# Patient Record
Sex: FEMALE | Race: WHITE | NOT HISPANIC OR LATINO | Employment: FULL TIME | URBAN - METROPOLITAN AREA
[De-identification: names, ages, dates, MRNs, and addresses within clinical notes are randomized per-mention and may not be internally consistent; named-entity substitution may affect disease eponyms.]

---

## 2017-11-01 ENCOUNTER — HOSPITAL ENCOUNTER (OUTPATIENT)
Dept: LAB | Facility: MEDICAL CENTER | Age: 52
End: 2017-11-01
Attending: GENERAL PRACTICE
Payer: COMMERCIAL

## 2017-11-01 PROCEDURE — 83090 ASSAY OF HOMOCYSTEINE: CPT

## 2017-11-01 PROCEDURE — 84443 ASSAY THYROID STIM HORMONE: CPT

## 2017-11-01 PROCEDURE — 84480 ASSAY TRIIODOTHYRONINE (T3): CPT

## 2017-11-01 PROCEDURE — 82670 ASSAY OF TOTAL ESTRADIOL: CPT

## 2017-11-01 PROCEDURE — 84436 ASSAY OF TOTAL THYROXINE: CPT

## 2017-11-01 PROCEDURE — 83550 IRON BINDING TEST: CPT

## 2017-11-01 PROCEDURE — 80053 COMPREHEN METABOLIC PANEL: CPT

## 2017-11-01 PROCEDURE — 82607 VITAMIN B-12: CPT

## 2017-11-01 PROCEDURE — 82306 VITAMIN D 25 HYDROXY: CPT

## 2017-11-01 PROCEDURE — 83525 ASSAY OF INSULIN: CPT

## 2017-11-01 PROCEDURE — 85025 COMPLETE CBC W/AUTO DIFF WBC: CPT

## 2017-11-01 PROCEDURE — 83540 ASSAY OF IRON: CPT

## 2017-11-01 PROCEDURE — 82626 DEHYDROEPIANDROSTERONE: CPT

## 2017-11-01 PROCEDURE — 82533 TOTAL CORTISOL: CPT

## 2017-11-01 PROCEDURE — 82746 ASSAY OF FOLIC ACID SERUM: CPT

## 2017-11-01 PROCEDURE — 86141 C-REACTIVE PROTEIN HS: CPT

## 2017-11-01 PROCEDURE — 81383 HLA II TYPING 1 ALLELE HR: CPT

## 2017-11-01 PROCEDURE — 80061 LIPID PANEL: CPT

## 2017-11-01 PROCEDURE — 81003 URINALYSIS AUTO W/O SCOPE: CPT

## 2017-11-01 PROCEDURE — 84402 ASSAY OF FREE TESTOSTERONE: CPT

## 2017-11-01 PROCEDURE — 84144 ASSAY OF PROGESTERONE: CPT

## 2017-11-01 PROCEDURE — 36415 COLL VENOUS BLD VENIPUNCTURE: CPT

## 2017-11-01 PROCEDURE — 84481 FREE ASSAY (FT-3): CPT

## 2017-11-01 PROCEDURE — 85652 RBC SED RATE AUTOMATED: CPT

## 2017-11-02 LAB
25(OH)D3 SERPL-MCNC: 15 NG/ML (ref 30–100)
ALBUMIN SERPL BCP-MCNC: 4.2 G/DL (ref 3.2–4.9)
ALBUMIN/GLOB SERPL: 1.6 G/DL
ALP SERPL-CCNC: 39 U/L (ref 30–99)
ALT SERPL-CCNC: 21 U/L (ref 2–50)
ANION GAP SERPL CALC-SCNC: 7 MMOL/L (ref 0–11.9)
APPEARANCE UR: CLEAR
AST SERPL-CCNC: 18 U/L (ref 12–45)
BASOPHILS # BLD AUTO: 0.7 % (ref 0–1.8)
BASOPHILS # BLD: 0.04 K/UL (ref 0–0.12)
BILIRUB SERPL-MCNC: 0.9 MG/DL (ref 0.1–1.5)
BILIRUB UR QL STRIP.AUTO: NEGATIVE
BUN SERPL-MCNC: 19 MG/DL (ref 8–22)
CALCIUM SERPL-MCNC: 9 MG/DL (ref 8.5–10.5)
CHLORIDE SERPL-SCNC: 105 MMOL/L (ref 96–112)
CHOLEST SERPL-MCNC: 203 MG/DL (ref 100–199)
CO2 SERPL-SCNC: 27 MMOL/L (ref 20–33)
COLOR UR: YELLOW
CORTIS SERPL-MCNC: 11.1 UG/DL (ref 0–23)
CREAT SERPL-MCNC: 0.64 MG/DL (ref 0.5–1.4)
CRP SERPL HS-MCNC: 0.9 MG/L (ref 0–7.5)
CULTURE IF INDICATED INDCX: NO UA CULTURE
EOSINOPHIL # BLD AUTO: 0.09 K/UL (ref 0–0.51)
EOSINOPHIL NFR BLD: 1.7 % (ref 0–6.9)
ERYTHROCYTE [DISTWIDTH] IN BLOOD BY AUTOMATED COUNT: 40.8 FL (ref 35.9–50)
ERYTHROCYTE [SEDIMENTATION RATE] IN BLOOD BY WESTERGREN METHOD: 6 MM/HOUR (ref 0–30)
ESTRADIOL SERPL-MCNC: 90 PG/ML
FOLATE SERPL-MCNC: 11.3 NG/ML
GFR SERPL CREATININE-BSD FRML MDRD: >60 ML/MIN/1.73 M 2
GLOBULIN SER CALC-MCNC: 2.6 G/DL (ref 1.9–3.5)
GLUCOSE SERPL-MCNC: 79 MG/DL (ref 65–99)
GLUCOSE UR STRIP.AUTO-MCNC: NEGATIVE MG/DL
HCT VFR BLD AUTO: 44.6 % (ref 37–47)
HCYS SERPL-SCNC: 11.2 UMOL/L
HDLC SERPL-MCNC: 56 MG/DL
HGB BLD-MCNC: 15.1 G/DL (ref 12–16)
IMM GRANULOCYTES # BLD AUTO: 0.01 K/UL (ref 0–0.11)
IMM GRANULOCYTES NFR BLD AUTO: 0.2 % (ref 0–0.9)
IRON SATN MFR SERPL: 28 % (ref 15–55)
IRON SERPL-MCNC: 77 UG/DL (ref 40–170)
KETONES UR STRIP.AUTO-MCNC: ABNORMAL MG/DL
LDLC SERPL CALC-MCNC: 119 MG/DL
LEUKOCYTE ESTERASE UR QL STRIP.AUTO: NEGATIVE
LYMPHOCYTES # BLD AUTO: 1.25 K/UL (ref 1–4.8)
LYMPHOCYTES NFR BLD: 23.4 % (ref 22–41)
MCH RBC QN AUTO: 30.8 PG (ref 27–33)
MCHC RBC AUTO-ENTMCNC: 33.9 G/DL (ref 33.6–35)
MCV RBC AUTO: 91 FL (ref 81.4–97.8)
MICRO URNS: ABNORMAL
MONOCYTES # BLD AUTO: 0.34 K/UL (ref 0–0.85)
MONOCYTES NFR BLD AUTO: 6.4 % (ref 0–13.4)
NEUTROPHILS # BLD AUTO: 3.62 K/UL (ref 2–7.15)
NEUTROPHILS NFR BLD: 67.6 % (ref 44–72)
NITRITE UR QL STRIP.AUTO: NEGATIVE
NRBC # BLD AUTO: 0 K/UL
NRBC BLD AUTO-RTO: 0 /100 WBC
PH UR STRIP.AUTO: 6 [PH]
PLATELET # BLD AUTO: 240 K/UL (ref 164–446)
PMV BLD AUTO: 9.9 FL (ref 9–12.9)
POTASSIUM SERPL-SCNC: 4.1 MMOL/L (ref 3.6–5.5)
PROGEST SERPL-MCNC: 0.31 NG/ML
PROT SERPL-MCNC: 6.8 G/DL (ref 6–8.2)
PROT UR QL STRIP: NEGATIVE MG/DL
RBC # BLD AUTO: 4.9 M/UL (ref 4.2–5.4)
RBC UR QL AUTO: NEGATIVE
SODIUM SERPL-SCNC: 139 MMOL/L (ref 135–145)
SP GR UR STRIP.AUTO: 1.03
T3 SERPL-MCNC: 95.8 NG/DL (ref 60–181)
T3FREE SERPL-MCNC: 3.35 PG/ML (ref 2.4–4.2)
T4 SERPL-MCNC: 6.8 UG/DL (ref 4–12)
TIBC SERPL-MCNC: 277 UG/DL (ref 250–450)
TRIGL SERPL-MCNC: 141 MG/DL (ref 0–149)
TSH SERPL DL<=0.005 MIU/L-ACNC: 2.01 UIU/ML (ref 0.3–3.7)
UROBILINOGEN UR STRIP.AUTO-MCNC: 0.2 MG/DL
VIT B12 SERPL-MCNC: 256 PG/ML (ref 211–911)
WBC # BLD AUTO: 5.4 K/UL (ref 4.8–10.8)

## 2017-11-03 LAB — INSULIN P FAST SERPL-ACNC: 6 UIU/ML (ref 3–19)

## 2017-11-04 LAB
DHEA SERPL-MCNC: 3.91 NG/ML (ref 0.63–4.7)
TESTOST FREE SERPL-MCNC: 2.7 PG/ML (ref 0.6–3.8)

## 2017-11-06 LAB
HLA-DQB1 QL: POSITIVE
NARCOLEPSY SPEC Q0167: ABNORMAL

## 2018-02-15 ENCOUNTER — HOSPITAL ENCOUNTER (OUTPATIENT)
Dept: RADIOLOGY | Facility: MEDICAL CENTER | Age: 53
End: 2018-02-15
Attending: OBSTETRICS & GYNECOLOGY
Payer: COMMERCIAL

## 2018-02-15 DIAGNOSIS — Z12.31 ENCOUNTER FOR SCREENING MAMMOGRAM FOR MALIGNANT NEOPLASM OF BREAST: ICD-10-CM

## 2018-02-15 PROCEDURE — 77063 BREAST TOMOSYNTHESIS BI: CPT

## 2018-11-02 ENCOUNTER — OFFICE VISIT (OUTPATIENT)
Dept: URGENT CARE | Facility: CLINIC | Age: 53
End: 2018-11-02
Payer: COMMERCIAL

## 2018-11-02 VITALS
TEMPERATURE: 98.4 F | HEART RATE: 94 BPM | OXYGEN SATURATION: 96 % | SYSTOLIC BLOOD PRESSURE: 120 MMHG | HEIGHT: 65 IN | BODY MASS INDEX: 24.32 KG/M2 | DIASTOLIC BLOOD PRESSURE: 84 MMHG | WEIGHT: 146 LBS

## 2018-11-02 DIAGNOSIS — R05.9 COUGH: ICD-10-CM

## 2018-11-02 DIAGNOSIS — J06.9 UPPER RESPIRATORY TRACT INFECTION, UNSPECIFIED TYPE: ICD-10-CM

## 2018-11-02 DIAGNOSIS — R09.81 SINUS CONGESTION: ICD-10-CM

## 2018-11-02 DIAGNOSIS — G43.809 OTHER MIGRAINE WITHOUT STATUS MIGRAINOSUS, NOT INTRACTABLE: ICD-10-CM

## 2018-11-02 PROCEDURE — 99204 OFFICE O/P NEW MOD 45 MIN: CPT | Performed by: PHYSICIAN ASSISTANT

## 2018-11-02 RX ORDER — KETOROLAC TROMETHAMINE 30 MG/ML
60 INJECTION, SOLUTION INTRAMUSCULAR; INTRAVENOUS ONCE
Status: COMPLETED | OUTPATIENT
Start: 2018-11-02 | End: 2018-11-02

## 2018-11-02 RX ORDER — AMOXICILLIN AND CLAVULANATE POTASSIUM 875; 125 MG/1; MG/1
1 TABLET, FILM COATED ORAL 2 TIMES DAILY
Qty: 20 TAB | Refills: 0 | Status: SHIPPED | OUTPATIENT
Start: 2018-11-02 | End: 2018-11-05

## 2018-11-02 RX ORDER — ONDANSETRON 4 MG/1
4 TABLET, FILM COATED ORAL EVERY 6 HOURS PRN
Qty: 20 TAB | Refills: 1 | Status: SHIPPED | OUTPATIENT
Start: 2018-11-02 | End: 2020-08-05

## 2018-11-02 RX ADMIN — KETOROLAC TROMETHAMINE 60 MG: 30 INJECTION, SOLUTION INTRAMUSCULAR; INTRAVENOUS at 10:46

## 2018-11-02 ASSESSMENT — ENCOUNTER SYMPTOMS
COUGH: 1
SORE THROAT: 0
EYE REDNESS: 0
DIARRHEA: 0
BLURRED VISION: 0
SHORTNESS OF BREATH: 0
HEADACHES: 1
SENSORY CHANGE: 0
PHOTOPHOBIA: 0
ABDOMINAL PAIN: 0
MYALGIAS: 0
VOMITING: 0
NAUSEA: 1
FOCAL WEAKNESS: 0
NUMBER OF EPISODES OF EMESIS TODAY: 1
SPUTUM PRODUCTION: 1
TINGLING: 0
CHILLS: 0
WHEEZING: 0
FEVER: 0

## 2018-11-02 NOTE — LETTER
November 2, 2018       Patient: Antoinette Cano   YOB: 1965   Date of Visit: 11/2/2018         To Whom It May Concern:    It is my medical opinion that Antoinette Cano should be excused from work for today due to illness.        If you have any questions or concerns, please don't hesitate to call [phone]          Sincerely,          Les Estrada P.A.-C.  Electronically Signed

## 2018-11-02 NOTE — PROGRESS NOTES
Subjective:     Antoinette Cano is a 53 y.o. female who presents for Nausea and Emesis       Nausea   This is a new problem. The current episode started 1 to 4 weeks ago. Associated symptoms include congestion, coughing, headaches and nausea ( with HA). Pertinent negatives include no abdominal pain, chills, fever, myalgias, rash, sore throat or vomiting.   Emesis   Associated symptoms include congestion, coughing, headaches and nausea ( with HA). Pertinent negatives include no abdominal pain, chills, fever, myalgias, rash, sore throat or vomiting.     Notes last 6-7d of cough, some prod now dry cough, denies fever/chills, c/o some emesis yesteray, notes some nausea w/ OTC cold meds, denies abdpain/diarrhea/rash, did have migraine yesterday, still w some mild s/sx today, PMH of migraine, c/o frontal sinus press w/ HA, denies PMH of sinusitis, denies wheeze, denies PMH of asthma, remote PMH of bronchitis, denies PMH of pneumonia, denies seasona allerg. Tried using cough meds, tylenol. Denies abdpain or diarrhea. C/o some abd cramping.    Past Medical History:   Diagnosis Date   • Dental disorder 4/13    multiple root canals   • Indigestion    • Infected puncture wound of plantar aspect of foot 6/23/13    on antibiotics   • Other specified symptom associated with female genital organs      Past Surgical History:   Procedure Laterality Date   • VAGINAL HYSTERECTOMY SCOPE TOTAL  7/23/2013    Performed by Cory Medeiros M.D. at SURGERY SAME DAY [facility]   • CYSTOSCOPY  7/23/2013    Performed by Cory Medeiros M.D. at SURGERY SAME DAY [facility]   • HYSTEROSCOPY WITH VIDEO OPERATIVE  2/7/2012    Performed by CORY MEDEIROS at SURGERY SAME DAY [facility]   • DILATION AND CURETTAGE  2/7/2012    Performed by CORY MEDEIROS at SURGERY SAME DAY [facility]   • OTHER  2010    tooth implant   • OTHER  2009    tooth extraction   • OTHER  2008    tooth extraction   • OTHER      LT knee surg     Social  "History     Social History   • Marital status:      Spouse name: N/A   • Number of children: N/A   • Years of education: N/A     Occupational History   • Not on file.     Social History Main Topics   • Smoking status: Never Smoker   • Smokeless tobacco: Never Used   • Alcohol use Yes      Comment: 1-2 per week   • Drug use: No   • Sexual activity: Not on file     Other Topics Concern   • Not on file     Social History Narrative   • No narrative on file    No family history on file. Review of Systems   Constitutional: Negative for chills and fever.   HENT: Positive for congestion. Negative for ear pain and sore throat.    Eyes: Negative for blurred vision, photophobia and redness.   Respiratory: Positive for cough and sputum production. Negative for shortness of breath and wheezing.    Gastrointestinal: Positive for nausea ( with HA). Negative for abdominal pain, diarrhea and vomiting.   Genitourinary: Negative for dysuria, frequency and hematuria.   Musculoskeletal: Negative for myalgias.   Skin: Negative for rash.   Neurological: Positive for headaches. Negative for tingling, sensory change and focal weakness.   Endo/Heme/Allergies: Negative for environmental allergies.     Allergies   Allergen Reactions   • Cleocin [Clindamycin Hcl] Vomiting   I have worn a mask for the entire encounter with this patient.    Objective:   /84 (BP Location: Left arm, Patient Position: Sitting)   Pulse 94   Temp 36.9 °C (98.4 °F) (Temporal)   Ht 1.651 m (5' 5\")   Wt 66.2 kg (146 lb)   LMP 06/29/2013   SpO2 96%   Breastfeeding? No   BMI 24.30 kg/m²   Physical Exam   Constitutional: She is oriented to person, place, and time. She appears well-developed and well-nourished. No distress.   HENT:   Head: Normocephalic and atraumatic.   Right Ear: Tympanic membrane, external ear and ear canal normal.   Left Ear: Tympanic membrane, external ear and ear canal normal.   Nose: Right sinus exhibits no maxillary sinus " tenderness and no frontal sinus tenderness. Left sinus exhibits maxillary sinus tenderness. Left sinus exhibits no frontal sinus tenderness.   Mouth/Throat: Uvula is midline and mucous membranes are normal. Posterior oropharyngeal erythema ( mild PND) present. No oropharyngeal exudate, posterior oropharyngeal edema or tonsillar abscesses.   Eyes: Pupils are equal, round, and reactive to light. Conjunctivae, EOM and lids are normal. Right eye exhibits no discharge and no exudate. Left eye exhibits no discharge and no exudate. Right conjunctiva is not injected. Right conjunctiva has no hemorrhage. Left conjunctiva is not injected. Left conjunctiva has no hemorrhage. No scleral icterus.   Neck: Neck supple.   Pulmonary/Chest: Effort normal. No respiratory distress. She has no decreased breath sounds. She has no wheezes. She has no rhonchi. She has no rales.   Musculoskeletal: Normal range of motion.   Lymphadenopathy:     She has cervical adenopathy ( mild bilat).   Neurological: She is alert and oriented to person, place, and time. She has normal strength. She is not disoriented. No cranial nerve deficit or sensory deficit. Coordination and gait normal.   Skin: Skin is warm and dry. She is not diaphoretic. No erythema. No pallor.   Psychiatric: Her speech is normal and behavior is normal.   Nursing note and vitals reviewed.  toradol - tolerates well      Assessment/Plan:   Assessment    1. Upper respiratory tract infection, unspecified type    2. Cough  - amoxicillin-clavulanate (AUGMENTIN) 875-125 MG Tab; Take 1 Tab by mouth 2 times a day.  Dispense: 20 Tab; Refill: 0    3. Sinus congestion  - amoxicillin-clavulanate (AUGMENTIN) 875-125 MG Tab; Take 1 Tab by mouth 2 times a day.  Dispense: 20 Tab; Refill: 0    4. Other migraine without status migrainosus, not intractable  - ketorolac (TORADOL) injection 60 mg; 2 mL by Intramuscular route Once.  - ondansetron (ZOFRAN) 4 MG Tab tablet; Take 1 Tab by mouth every 6 hours  as needed for Nausea/Vomiting.  Dispense: 20 Tab; Refill: 1  Supportive care is reviewed with patient/caregiver - recommend to push PO fluids and electrolytes, Nsaids/tylenol, netti pot/saline irrig, humidifier in home, flonase, ponaris, antihistamine, zofran PRN continued nausea, toradol for migraine now, Contingent antibiotic prescription given to patient to fill upon meeting criteria of guidelines discussed.   If filling,  take full course of Rx, take with probiotics, observe for resolution  Return to clinic with lack of resolution or progression of symptoms.      Differential diagnosis, natural history, supportive care, and indications for immediate follow-up discussed.

## 2018-11-05 ENCOUNTER — OFFICE VISIT (OUTPATIENT)
Dept: URGENT CARE | Facility: CLINIC | Age: 53
End: 2018-11-05
Payer: COMMERCIAL

## 2018-11-05 VITALS
SYSTOLIC BLOOD PRESSURE: 110 MMHG | TEMPERATURE: 98.3 F | DIASTOLIC BLOOD PRESSURE: 68 MMHG | OXYGEN SATURATION: 95 % | HEART RATE: 82 BPM | RESPIRATION RATE: 20 BRPM

## 2018-11-05 DIAGNOSIS — H66.002 ACUTE SUPPURATIVE OTITIS MEDIA OF LEFT EAR WITHOUT SPONTANEOUS RUPTURE OF TYMPANIC MEMBRANE, RECURRENCE NOT SPECIFIED: Primary | ICD-10-CM

## 2018-11-05 DIAGNOSIS — J40 BRONCHITIS: ICD-10-CM

## 2018-11-05 DIAGNOSIS — J06.9 URI WITH COUGH AND CONGESTION: ICD-10-CM

## 2018-11-05 PROCEDURE — 99214 OFFICE O/P EST MOD 30 MIN: CPT | Performed by: PHYSICIAN ASSISTANT

## 2018-11-05 RX ORDER — METHYLPREDNISOLONE 4 MG/1
TABLET ORAL
Qty: 21 TAB | Refills: 0 | Status: SHIPPED | OUTPATIENT
Start: 2018-11-05 | End: 2018-11-05 | Stop reason: SDUPTHER

## 2018-11-05 RX ORDER — CEFDINIR 300 MG/1
300 CAPSULE ORAL 2 TIMES DAILY
Qty: 20 CAP | Refills: 0 | Status: SHIPPED | OUTPATIENT
Start: 2018-11-05 | End: 2018-11-15

## 2018-11-05 RX ORDER — PROMETHAZINE HYDROCHLORIDE AND CODEINE PHOSPHATE 6.25; 1 MG/5ML; MG/5ML
5 SYRUP ORAL 4 TIMES DAILY PRN
Qty: 120 ML | Refills: 0 | Status: SHIPPED | OUTPATIENT
Start: 2018-11-05 | End: 2018-11-12

## 2018-11-05 RX ORDER — METHYLPREDNISOLONE 4 MG/1
TABLET ORAL
Qty: 21 TAB | Refills: 0 | Status: SHIPPED | OUTPATIENT
Start: 2018-11-05 | End: 2020-08-05

## 2018-11-05 RX ORDER — CEFDINIR 300 MG/1
300 CAPSULE ORAL 2 TIMES DAILY
Qty: 20 CAP | Refills: 0 | Status: SHIPPED | OUTPATIENT
Start: 2018-11-05 | End: 2018-11-05 | Stop reason: SDUPTHER

## 2018-11-06 NOTE — PROGRESS NOTES
Subjective:      Pt is a 53 y.o. female who presents with Otalgia (Almost a week earaches .)            HPI  PT presents to  clinic today complaining of sore throat, fevers, pressure in ears, cough, fatigue, runny nose, wheezing and SOB. PT denies CP, NVD, abdominal pain, joint pain. PT states these symptoms began around 5 days ago. PT states the pain is a 7/10 with coughing fits, aching in nature and worse at night. Pt has taken Augmentin for this condition and notes worsening symptoms and new ear pain on left. The pt's medication list, problem list, and allergies have been evaluated and reviewed during today's visit.    PMH:  Past Medical History:   Diagnosis Date   • Dental disorder 4/13    multiple root canals   • Indigestion    • Infected puncture wound of plantar aspect of foot 6/23/13    on antibiotics   • Other specified symptom associated with female genital organs        PSH:  Past Surgical History:   Procedure Laterality Date   • VAGINAL HYSTERECTOMY SCOPE TOTAL  7/23/2013    Performed by Cory Medeiros M.D. at SURGERY [facility] DAY [facility]   • CYSTOSCOPY  7/23/2013    Performed by Cory Medeiros M.D. at SURGERY SAME DAY [facility]   • HYSTEROSCOPY WITH VIDEO OPERATIVE  2/7/2012    Performed by CORY MEDEIROS at SURGERY SAME DAY [facility]   • DILATION AND CURETTAGE  2/7/2012    Performed by CORY MEDEIROS at SURGERY SAME DAY [facility]   • OTHER  2010    tooth implant   • OTHER  2009    tooth extraction   • OTHER  2008    tooth extraction   • OTHER      LT knee surg       Fam Hx:  the patient's family history is not pertinent to their current complaint      Soc HX:  Social History     Social History   • Marital status:      Spouse name: N/A   • Number of children: N/A   • Years of education: N/A     Occupational History   • Not on file.     Social History Main Topics   • Smoking status: Never Smoker   • Smokeless tobacco: Never Used   • Alcohol use Yes      Comment: 1-2 per  week   • Drug use: No   • Sexual activity: Not on file     Other Topics Concern   • Not on file     Social History Narrative   • No narrative on file         Medications:    Current Outpatient Prescriptions:   •  promethazine-codeine (PHENERGAN-CODEINE) 6.25-10 MG/5ML Syrup, Take 5 mL by mouth 4 times a day as needed for up to 7 days., Disp: 120 mL, Rfl: 0  •  cefdinir (OMNICEF) 300 MG Cap, Take 1 Cap by mouth 2 times a day for 10 days., Disp: 20 Cap, Rfl: 0  •  MethylPREDNISolone (MEDROL DOSEPAK) 4 MG Tablet Therapy Pack, Use as directed, Disp: 21 Tab, Rfl: 0  •  ondansetron (ZOFRAN) 4 MG Tab tablet, Take 1 Tab by mouth every 6 hours as needed for Nausea/Vomiting., Disp: 20 Tab, Rfl: 1      Allergies:  Cleocin [clindamycin hcl]    ROS  Review of Systems   Constitutional: Positive for malaise/fatigue. Negative for fever and diaphoresis.   HENT: Positive for congestion and sore throat. +ear pain on left.    Eyes: Negative for blurred vision, double vision and photophobia.   Respiratory: Positive for cough, sputum production, shortness of breath and wheezing. Negative for hemoptysis.    Cardiovascular: Negative for chest pain and palpitations.   Gastrointestinal: Negative for nausea, vomiting, abdominal pain, diarrhea and constipation.   Genitourinary: Negative for dysuria and flank pain.   Musculoskeletal: Negative for joint pain and myalgias.   Skin: Negative for itching and rash.   Neurological:  Negative for dizziness, tingling and weakness.   Endo/Heme/Allergies: Does not bruise/bleed easily.   Psychiatric/Behavioral: Negative for depression. The patient is not nervous/anxious.             Objective:     /68 (BP Location: Left arm, Patient Position: Sitting, BP Cuff Size: Adult)   Pulse 82   Temp 36.8 °C (98.3 °F) (Temporal)   Resp 20   LMP 06/29/2013   SpO2 95%      Physical Exam      Physical Exam   Constitutional: PT is oriented to person, place, and time. PT appears well-developed and  well-nourished. No distress.   HENT:   Head: Normocephalic and atraumatic.   Right Ear: Hearing, tympanic membrane, external ear and ear canal normal.   Left Ear: Hearing, external ear and ear canal normal. Tympanic membrane is erythematous and bulging. A middle ear effusion is present.   Nose: Mucosal edema, rhinorrhea and sinus tenderness present. Right sinus exhibits frontal sinus tenderness. Left sinus exhibits frontal sinus tenderness.   Mouth/Throat: Uvula is midline. Mucous membranes are pale. Posterior oropharyngeal edema and posterior oropharyngeal erythema present. No oropharyngeal exudate.   Eyes: Conjunctivae normal and EOM are normal. Pupils are equal, round, and reactive to light. Right eye exhibits no discharge. Left eye exhibits no discharge.   Neck: Normal range of motion. Neck supple. No thyromegaly present.   Cardiovascular: Normal rate, regular rhythm, normal heart sounds and intact distal pulses.  Exam reveals no gallop and no friction rub.    No murmur heard.  Pulmonary/Chest: Effort normal. No respiratory distress. PT has wheezes. PT has no rales. PT exhibits tenderness.   Abdominal: Soft. Bowel sounds are normal. PT exhibits no distension and no mass. There is no tenderness. There is no rebound and no guarding.   Musculoskeletal: Normal range of motion. PT exhibits no edema and no tenderness.   Lymphadenopathy:     PT has no cervical adenopathy.   Neurological: Pt is alert and oriented to person, place, and time. Pt has normal reflexes. No cranial nerve deficit.   Skin: Skin is warm and dry. No rash noted. No erythema.   Psychiatric: PT has a normal mood and affect. Pt behavior is normal. Judgment and thought content normal.          Assessment/Plan:     1. Acute suppurative otitis media of left ear without spontaneous rupture of tympanic membrane, recurrence not specified    - cefdinir (OMNICEF) 300 MG Cap; Take 1 Cap by mouth 2 times a day for 10 days.  Dispense: 20 Cap; Refill: 0  -  MethylPREDNISolone (MEDROL DOSEPAK) 4 MG Tablet Therapy Pack; Use as directed  Dispense: 21 Tab; Refill: 0    2. Bronchitis    - cefdinir (OMNICEF) 300 MG Cap; Take 1 Cap by mouth 2 times a day for 10 days.  Dispense: 20 Cap; Refill: 0  - MethylPREDNISolone (MEDROL DOSEPAK) 4 MG Tablet Therapy Pack; Use as directed  Dispense: 21 Tab; Refill: 0    3. URI with cough and congestion    - promethazine-codeine (PHENERGAN-CODEINE) 6.25-10 MG/5ML Syrup; Take 5 mL by mouth 4 times a day as needed for up to 7 days.  Dispense: 120 mL; Refill: 0  - MethylPREDNISolone (MEDROL DOSEPAK) 4 MG Tablet Therapy Pack; Use as directed  Dispense: 21 Tab; Refill: 0    Rest, fluids encouraged.  OTC decongestant for congestion/cough  AVS with medical info given.  Pt was in full understanding and agreement with the plan.  Follow-up as needed if symptoms worsen or fail to improve.

## 2019-02-28 ENCOUNTER — HOSPITAL ENCOUNTER (OUTPATIENT)
Dept: RADIOLOGY | Facility: MEDICAL CENTER | Age: 54
End: 2019-02-28
Attending: OBSTETRICS & GYNECOLOGY
Payer: COMMERCIAL

## 2019-02-28 DIAGNOSIS — Z12.31 BREAST CANCER SCREENING BY MAMMOGRAM: ICD-10-CM

## 2019-02-28 PROCEDURE — 77063 BREAST TOMOSYNTHESIS BI: CPT

## 2020-01-07 ENCOUNTER — OFFICE VISIT (OUTPATIENT)
Dept: URGENT CARE | Facility: CLINIC | Age: 55
End: 2020-01-07
Payer: COMMERCIAL

## 2020-01-07 VITALS
TEMPERATURE: 98.6 F | WEIGHT: 149 LBS | BODY MASS INDEX: 24.83 KG/M2 | DIASTOLIC BLOOD PRESSURE: 88 MMHG | SYSTOLIC BLOOD PRESSURE: 132 MMHG | OXYGEN SATURATION: 95 % | HEIGHT: 65 IN | HEART RATE: 82 BPM | RESPIRATION RATE: 14 BRPM

## 2020-01-07 DIAGNOSIS — J06.9 UPPER RESPIRATORY TRACT INFECTION, UNSPECIFIED TYPE: ICD-10-CM

## 2020-01-07 PROCEDURE — 99214 OFFICE O/P EST MOD 30 MIN: CPT | Performed by: PHYSICIAN ASSISTANT

## 2020-01-07 RX ORDER — BENZONATATE 200 MG/1
200 CAPSULE ORAL 3 TIMES DAILY PRN
Qty: 30 CAP | Refills: 0 | Status: SHIPPED
Start: 2020-01-07 | End: 2020-08-05

## 2020-01-07 ASSESSMENT — ENCOUNTER SYMPTOMS
RHINORRHEA: 1
SINUS PAIN: 0
VOMITING: 0
WHEEZING: 0
FEVER: 0
SHORTNESS OF BREATH: 0
SORE THROAT: 0
MYALGIAS: 0
HEADACHES: 1
CHILLS: 0
HEMOPTYSIS: 0
DIARRHEA: 0
ABDOMINAL PAIN: 0
SPUTUM PRODUCTION: 1
COUGH: 1
NAUSEA: 0
PALPITATIONS: 0

## 2020-01-07 NOTE — PROGRESS NOTES
Subjective:      Antoinette Cano is a 54 y.o. female who presents with Cough (x4 days) and Migraine            Cough   This is a new problem. Episode onset: 4 days  The problem has been unchanged. The cough is productive of sputum (small amount of yellow mucous today). Associated symptoms include headaches, nasal congestion and rhinorrhea. Pertinent negatives include no chest pain, chills, ear congestion, ear pain, fever, hemoptysis, myalgias, rash, sore throat, shortness of breath or wheezing. The symptoms are aggravated by lying down. Treatments tried: tylenol. Dayqil, Nyquil. There is no history of asthma, bronchitis or pneumonia.         Past Medical History:   Diagnosis Date   • Dental disorder 4/13    multiple root canals   • Indigestion    • Infected puncture wound of plantar aspect of foot 6/23/13    on antibiotics   • Other specified symptom associated with female genital organs        Past Surgical History:   Procedure Laterality Date   • VAGINAL HYSTERECTOMY SCOPE TOTAL  7/23/2013    Performed by Cory Medeiros M.D. at SURGERY SAME DAY [facility]   • CYSTOSCOPY  7/23/2013    Performed by Cory Medeiros M.D. at [facility] DAY [facility]   • HYSTEROSCOPY WITH VIDEO OPERATIVE  2/7/2012    Performed by CORY MEDEIROS at [facility]   • DILATION AND CURETTAGE  2/7/2012    Performed by CORY MEDEIROS at SURGERY SAME DAY [facility]   • OTHER  2010    tooth implant   • OTHER  2009    tooth extraction   • OTHER  2008    tooth extraction   • OTHER      LT knee surg       History reviewed. No pertinent family history.    Allergies   Allergen Reactions   • Cleocin [Clindamycin Hcl] Vomiting       Medications, Allergies, and current problem list reviewed today in Epic    Review of Systems   Constitutional: Positive for malaise/fatigue. Negative for chills and fever.   HENT: Positive for congestion and rhinorrhea. Negative for ear discharge, ear pain, sinus pain and sore  "throat.    Respiratory: Positive for cough and sputum production (scant yellow mucous). Negative for hemoptysis, shortness of breath and wheezing.    Cardiovascular: Negative for chest pain, palpitations and leg swelling.   Gastrointestinal: Negative for abdominal pain, diarrhea, nausea and vomiting.   Musculoskeletal: Negative for myalgias.   Skin: Negative for rash.   Neurological: Positive for headaches.     All other systems reviewed and are negative.        Objective:     /88 (BP Location: Right arm, Patient Position: Sitting)   Pulse 82   Temp 37 °C (98.6 °F)   Resp 14   Ht 1.651 m (5' 5\")   Wt 67.6 kg (149 lb)   LMP 06/29/2013   SpO2 95%   BMI 24.79 kg/m²      Physical Exam  Constitutional:       General: She is not in acute distress.  HENT:      Head: Normocephalic and atraumatic.      Right Ear: Tympanic membrane, ear canal and external ear normal.      Left Ear: Tympanic membrane, ear canal and external ear normal.      Nose: Congestion and rhinorrhea (clear) present.      Mouth/Throat:      Mouth: Mucous membranes are moist.   Eyes:      Conjunctiva/sclera: Conjunctivae normal.   Cardiovascular:      Rate and Rhythm: Normal rate and regular rhythm.      Heart sounds: Normal heart sounds. No murmur. No friction rub. No gallop.    Pulmonary:      Effort: Pulmonary effort is normal. No respiratory distress.      Breath sounds: Normal breath sounds. No wheezing, rhonchi or rales.   Musculoskeletal: Normal range of motion.   Lymphadenopathy:      Cervical: No cervical adenopathy.   Skin:     General: Skin is warm and dry.   Neurological:      General: No focal deficit present.      Mental Status: She is alert and oriented to person, place, and time.   Psychiatric:         Mood and Affect: Mood normal.         Behavior: Behavior normal.         Thought Content: Thought content normal.         Judgment: Judgment normal.                 Assessment/Plan:     1. Upper respiratory tract infection, " unspecified type  benzonatate (TESSALON) 200 MG capsule     Viral etiology discussed. No signs of bacterial illness on exam  Continue Conservative treatment.  Push fluids, rest, humidification.  RX tessalon       Differential diagnoses, Supportive care, and indications for immediate follow-up discussed with patient.   Instructed to return to clinic or nearest emergency department for any change in condition, further concerns, or worsening of symptoms.    The patient demonstrated a good understanding and agreed with the treatment plan.    Ilana Daniel P.A.-C.

## 2020-03-19 ENCOUNTER — APPOINTMENT (OUTPATIENT)
Dept: RADIOLOGY | Facility: MEDICAL CENTER | Age: 55
End: 2020-03-19
Attending: OBSTETRICS & GYNECOLOGY
Payer: COMMERCIAL

## 2020-06-03 ENCOUNTER — APPOINTMENT (OUTPATIENT)
Dept: RADIOLOGY | Facility: MEDICAL CENTER | Age: 55
End: 2020-06-03
Attending: OBSTETRICS & GYNECOLOGY
Payer: COMMERCIAL

## 2020-06-12 ENCOUNTER — TELEPHONE (OUTPATIENT)
Dept: SCHEDULING | Facility: IMAGING CENTER | Age: 55
End: 2020-06-12

## 2020-07-27 ENCOUNTER — APPOINTMENT (OUTPATIENT)
Dept: RADIOLOGY | Facility: MEDICAL CENTER | Age: 55
End: 2020-07-27
Attending: OBSTETRICS & GYNECOLOGY
Payer: COMMERCIAL

## 2020-08-05 ENCOUNTER — OFFICE VISIT (OUTPATIENT)
Dept: MEDICAL GROUP | Facility: PHYSICIAN GROUP | Age: 55
End: 2020-08-05
Payer: COMMERCIAL

## 2020-08-05 VITALS
WEIGHT: 145.8 LBS | HEART RATE: 70 BPM | RESPIRATION RATE: 20 BRPM | TEMPERATURE: 99 F | OXYGEN SATURATION: 98 % | HEIGHT: 65 IN | BODY MASS INDEX: 24.29 KG/M2 | SYSTOLIC BLOOD PRESSURE: 118 MMHG | DIASTOLIC BLOOD PRESSURE: 68 MMHG

## 2020-08-05 DIAGNOSIS — Z23 NEED FOR VACCINATION: ICD-10-CM

## 2020-08-05 DIAGNOSIS — R42 EPISODE OF DIZZINESS: ICD-10-CM

## 2020-08-05 DIAGNOSIS — Z00.00 ANNUAL PHYSICAL EXAM: ICD-10-CM

## 2020-08-05 PROCEDURE — 99214 OFFICE O/P EST MOD 30 MIN: CPT | Mod: 25 | Performed by: NURSE PRACTITIONER

## 2020-08-05 PROCEDURE — 90471 IMMUNIZATION ADMIN: CPT | Performed by: NURSE PRACTITIONER

## 2020-08-05 PROCEDURE — 90472 IMMUNIZATION ADMIN EACH ADD: CPT | Performed by: NURSE PRACTITIONER

## 2020-08-05 PROCEDURE — 90750 HZV VACC RECOMBINANT IM: CPT | Performed by: NURSE PRACTITIONER

## 2020-08-05 PROCEDURE — 90715 TDAP VACCINE 7 YRS/> IM: CPT | Performed by: NURSE PRACTITIONER

## 2020-08-05 ASSESSMENT — PATIENT HEALTH QUESTIONNAIRE - PHQ9: CLINICAL INTERPRETATION OF PHQ2 SCORE: 0

## 2020-08-05 NOTE — ASSESSMENT & PLAN NOTE
New to me. New problem. Initial episode started 1 yr ago, following episode 6 mos ago, no pre-syncopy or LOC. No associated symptoms of fevers, nausea. Local to the area and adapted to the regional elevation. She does however mentions may have not been well hydrated at those times. She denies CP, SOB, palpitations, HAs, NVD.  Not a diabetic, BP stable, not on any meds or supplement, not a smoker, no ETOG or street drug. No Hx of anemia, following normal diet.  Pt states she is not concerned, however wanted to mention.

## 2020-08-05 NOTE — PROGRESS NOTES
Chief Complaint   Patient presents with   • Establish Care       HISTORY OF PRESENT ILLNESS: Patient is a 55 y.o. female, established patient who presents today to discuss medical problems as listed below:    Health Maintenance:  COMPLETED    Episode of dizziness  New to me. New problem. Initial episode started 1 yr ago, following episode 6 mos ago, no pre-syncopy or LOC. No associated symptoms of fevers, nausea. Local to the area and adapted to the regional elevation. She does however mentions may have not been well hydrated at those times. She denies CP, SOB, palpitations, HAs, NVD.  Not a diabetic, BP stable, not on any meds or supplement, not a smoker, no ETOG or street drug. No Hx of anemia, following normal diet.  Pt states she is not concerned, however wanted to mention.      Patient Active Problem List    Diagnosis Date Noted   • Episode of dizziness 08/05/2020   • Uterine prolapse 07/23/2013        Allergies: Cleocin [clindamycin hcl]    No current outpatient medications on file.     No current facility-administered medications for this visit.        Social History     Tobacco Use   • Smoking status: Never Smoker   • Smokeless tobacco: Never Used   Substance Use Topics   • Alcohol use: Yes     Comment: 1-2 per week   • Drug use: No     Social History     Social History Narrative   • Not on file       Family History   Problem Relation Age of Onset   • Stroke Paternal Grandfather        Allergies, past medical history, past surgical history, family history, social history reviewed and updated.    Review of Systems:     - Constitutional: Negative for fever, chills, unexpected weight change, and fatigue/generalized weakness.     - HEENT: Negative for headaches, vision changes, hearing changes, ear pain, ear discharge, rhinorrhea, sinus congestion, sore throat, and neck pain.      - Respiratory: Negative for cough, sputum production, chest congestion, dyspnea, wheezing, and crackles.      - Cardiovascular:  "Negative for chest pain, palpitations, orthopnea, and bilateral lower extremity edema.     - Gastrointestinal: Negative for heartburn, nausea, vomiting, abdominal pain, hematochezia, melena, diarrhea, constipation, and greasy/foul-smelling stools.     - Genitourinary: Negative for dysuria, polyuria, hematuria, pyuria, urinary urgency, and urinary incontinence.    - Musculoskeletal: Negative for myalgias, back pain, and joint pain.     - Skin: Negative for rash, itching, cyanotic skin color change.     - Neurological:  2 episodes of dizziness. Negative for tingling, tremors, focal sensory deficit, focal weakness and headaches.     - Endo/Heme/Allergies: Does not bruise/bleed easily.     - Psychiatric/Behavioral: Negative for depression, suicidal/homicidal ideation and memory loss.      All other systems reviewed and are negative    Exam:    /68 (BP Location: Right arm, Patient Position: Sitting, BP Cuff Size: Adult)   Pulse 70   Temp 37.2 °C (99 °F) (Temporal)   Resp 20   Ht 1.651 m (5' 5\")   Wt 66.1 kg (145 lb 12.8 oz)   LMP 06/29/2013   SpO2 98%   BMI 24.26 kg/m²  Body mass index is 24.26 kg/m².    Physical Exam:  Constitutional: Well-developed and well-nourished. Not diaphoretic. No distress.   Skin: Skin is warm and dry. No rash noted.  Head: Atraumatic without lesions.  Eyes: Conjunctivae and extraocular motions are normal. Pupils are equal, round, and reactive to light. No scleral icterus.   Ears:  External ears unremarkable. Tympanic membranes clear and intact.  Nose: Nares patent. Septum midline. Turbinates without erythema nor edema. No discharge.   Mouth/Throat: Dentition is normal. Tongue normal. Oropharynx is clear and moist. Posterior pharynx without erythema or exudates.  Neck: Supple, trachea midline. Normal range of motion. No thyromegaly present. No lymphadenopathy--cervical or supraclavicular.  Cardiovascular: Regular rate and rhythm, S1 and S2 without murmur, rubs, or gallops.  "   Chest: Effort normal. Clear to auscultation throughout. No adventitious sounds. No CVA tenderness.  Abdomen: Soft, non tender, and without distention. Active bowel sounds in all four quadrants. No rebound, guarding, masses or HSM.  : Negative for dysuria, polyuria, hematuria, pyuria, urinary urgency, and urinary incontinence.  Extremities: No cyanosis, clubbing, erythema, nor edema. Distal pulses intact and symmetric.   Musculoskeletal: All major joints AROM full in all directions without pain.  Neurological: Alert and oriented x 3. DTRs 2+/3 and symmetric. No cranial nerve deficit. 5/5 myotomes. Sensation intact. Negative Rhomberg.  Psychiatric:  Behavior, mood, and affect are appropriate.  MA/nursing note and vitals reviewed.    Assessment/Plan:  1. Need for vaccination  - Tdap Vaccine =>6YO IM  - Shingrix Vaccine    2. Annual physical exam  - CBC WITH DIFFERENTIAL; Future  - Comp Metabolic Panel; Future  - Lipid Profile; Future  - HEMOGLOBIN A1C; Future  - TSH; Future  - VITAMIN D,25 HYDROXY; Future  - FREE THYROXINE; Future    3. Episode of dizziness  Will obtain labs, will follow up next visit, will obtain orthostatic BP       Discussed with patient possible alternative diagnoses, pt is to take all medications as prescribed. If symptoms persist FU w/PCP, if symptoms worsen go to emergency room. If experiencing any side effects from prescribed medications reports to the office immediately or go to emergency room.  Reviewed indication, dosage, usage and potential adverse effects of prescribed medications. Reviewed risks and benefits of treatment plan. Patient verbalizes understanding of all instruction and verbally agrees to plan.    No follow-ups on file.

## 2020-08-27 ENCOUNTER — HOSPITAL ENCOUNTER (OUTPATIENT)
Dept: LAB | Facility: MEDICAL CENTER | Age: 55
End: 2020-08-27
Attending: NURSE PRACTITIONER
Payer: COMMERCIAL

## 2020-08-27 DIAGNOSIS — Z00.00 ANNUAL PHYSICAL EXAM: ICD-10-CM

## 2020-08-27 LAB
25(OH)D3 SERPL-MCNC: 29 NG/ML (ref 30–100)
ALBUMIN SERPL BCP-MCNC: 4.5 G/DL (ref 3.2–4.9)
ALBUMIN/GLOB SERPL: 1.8 G/DL
ALP SERPL-CCNC: 58 U/L (ref 30–99)
ALT SERPL-CCNC: 16 U/L (ref 2–50)
ANION GAP SERPL CALC-SCNC: 9 MMOL/L (ref 7–16)
AST SERPL-CCNC: 15 U/L (ref 12–45)
BASOPHILS # BLD AUTO: 0.4 % (ref 0–1.8)
BASOPHILS # BLD: 0.02 K/UL (ref 0–0.12)
BILIRUB SERPL-MCNC: 0.9 MG/DL (ref 0.1–1.5)
BUN SERPL-MCNC: 17 MG/DL (ref 8–22)
CALCIUM SERPL-MCNC: 9.3 MG/DL (ref 8.5–10.5)
CHLORIDE SERPL-SCNC: 99 MMOL/L (ref 96–112)
CHOLEST SERPL-MCNC: 231 MG/DL (ref 100–199)
CO2 SERPL-SCNC: 27 MMOL/L (ref 20–33)
CREAT SERPL-MCNC: 0.71 MG/DL (ref 0.5–1.4)
EOSINOPHIL # BLD AUTO: 0.17 K/UL (ref 0–0.51)
EOSINOPHIL NFR BLD: 3.2 % (ref 0–6.9)
ERYTHROCYTE [DISTWIDTH] IN BLOOD BY AUTOMATED COUNT: 41.8 FL (ref 35.9–50)
EST. AVERAGE GLUCOSE BLD GHB EST-MCNC: 114 MG/DL
FASTING STATUS PATIENT QL REPORTED: NORMAL
GLOBULIN SER CALC-MCNC: 2.5 G/DL (ref 1.9–3.5)
GLUCOSE SERPL-MCNC: 88 MG/DL (ref 65–99)
HBA1C MFR BLD: 5.6 % (ref 0–5.6)
HCT VFR BLD AUTO: 46.9 % (ref 37–47)
HDLC SERPL-MCNC: 61 MG/DL
HGB BLD-MCNC: 15.6 G/DL (ref 12–16)
IMM GRANULOCYTES # BLD AUTO: 0.01 K/UL (ref 0–0.11)
IMM GRANULOCYTES NFR BLD AUTO: 0.2 % (ref 0–0.9)
LDLC SERPL CALC-MCNC: 149 MG/DL
LYMPHOCYTES # BLD AUTO: 1.48 K/UL (ref 1–4.8)
LYMPHOCYTES NFR BLD: 27.8 % (ref 22–41)
MCH RBC QN AUTO: 30.5 PG (ref 27–33)
MCHC RBC AUTO-ENTMCNC: 33.3 G/DL (ref 33.6–35)
MCV RBC AUTO: 91.8 FL (ref 81.4–97.8)
MONOCYTES # BLD AUTO: 0.4 K/UL (ref 0–0.85)
MONOCYTES NFR BLD AUTO: 7.5 % (ref 0–13.4)
NEUTROPHILS # BLD AUTO: 3.25 K/UL (ref 2–7.15)
NEUTROPHILS NFR BLD: 60.9 % (ref 44–72)
NRBC # BLD AUTO: 0 K/UL
NRBC BLD-RTO: 0 /100 WBC
PLATELET # BLD AUTO: 266 K/UL (ref 164–446)
PMV BLD AUTO: 10.2 FL (ref 9–12.9)
POTASSIUM SERPL-SCNC: 3.8 MMOL/L (ref 3.6–5.5)
PROT SERPL-MCNC: 7 G/DL (ref 6–8.2)
RBC # BLD AUTO: 5.11 M/UL (ref 4.2–5.4)
SODIUM SERPL-SCNC: 135 MMOL/L (ref 135–145)
T4 FREE SERPL-MCNC: 1.31 NG/DL (ref 0.93–1.7)
TRIGL SERPL-MCNC: 106 MG/DL (ref 0–149)
TSH SERPL DL<=0.005 MIU/L-ACNC: 3.66 UIU/ML (ref 0.38–5.33)
WBC # BLD AUTO: 5.3 K/UL (ref 4.8–10.8)

## 2020-08-27 PROCEDURE — 80053 COMPREHEN METABOLIC PANEL: CPT

## 2020-08-27 PROCEDURE — 85025 COMPLETE CBC W/AUTO DIFF WBC: CPT

## 2020-08-27 PROCEDURE — 80061 LIPID PANEL: CPT

## 2020-08-27 PROCEDURE — 84439 ASSAY OF FREE THYROXINE: CPT

## 2020-08-27 PROCEDURE — 82306 VITAMIN D 25 HYDROXY: CPT

## 2020-08-27 PROCEDURE — 83036 HEMOGLOBIN GLYCOSYLATED A1C: CPT

## 2020-08-27 PROCEDURE — 36415 COLL VENOUS BLD VENIPUNCTURE: CPT

## 2020-08-27 PROCEDURE — 84443 ASSAY THYROID STIM HORMONE: CPT

## 2020-09-30 ENCOUNTER — OFFICE VISIT (OUTPATIENT)
Dept: MEDICAL GROUP | Facility: PHYSICIAN GROUP | Age: 55
End: 2020-09-30
Payer: COMMERCIAL

## 2020-09-30 VITALS
HEART RATE: 64 BPM | HEIGHT: 65 IN | BODY MASS INDEX: 24.83 KG/M2 | SYSTOLIC BLOOD PRESSURE: 106 MMHG | OXYGEN SATURATION: 97 % | WEIGHT: 149 LBS | DIASTOLIC BLOOD PRESSURE: 80 MMHG | TEMPERATURE: 97.9 F

## 2020-09-30 DIAGNOSIS — E78.2 MIXED HYPERLIPIDEMIA: ICD-10-CM

## 2020-09-30 DIAGNOSIS — R42 EPISODE OF DIZZINESS: ICD-10-CM

## 2020-09-30 DIAGNOSIS — R00.2 PALPITATIONS: ICD-10-CM

## 2020-09-30 DIAGNOSIS — R42 DIZZINESS: ICD-10-CM

## 2020-09-30 DIAGNOSIS — Z23 NEED FOR VACCINATION: ICD-10-CM

## 2020-09-30 DIAGNOSIS — E55.9 VITAMIN D DEFICIENCY: ICD-10-CM

## 2020-09-30 PROCEDURE — 99214 OFFICE O/P EST MOD 30 MIN: CPT | Mod: 25 | Performed by: NURSE PRACTITIONER

## 2020-09-30 PROCEDURE — 90686 IIV4 VACC NO PRSV 0.5 ML IM: CPT | Performed by: NURSE PRACTITIONER

## 2020-09-30 PROCEDURE — 90471 IMMUNIZATION ADMIN: CPT | Performed by: NURSE PRACTITIONER

## 2020-09-30 PROCEDURE — 90472 IMMUNIZATION ADMIN EACH ADD: CPT | Performed by: NURSE PRACTITIONER

## 2020-09-30 PROCEDURE — 90750 HZV VACC RECOMBINANT IM: CPT | Performed by: NURSE PRACTITIONER

## 2020-09-30 RX ORDER — ERGOCALCIFEROL 1.25 MG/1
50000 CAPSULE ORAL
Qty: 12 CAP | Refills: 1 | Status: SHIPPED | OUTPATIENT
Start: 2020-09-30 | End: 2021-05-03

## 2020-09-30 RX ORDER — ESTRADIOL 0.04 MG/D
1 PATCH TRANSDERMAL
COMMUNITY

## 2020-09-30 ASSESSMENT — FIBROSIS 4 INDEX: FIB4 SCORE: 0.78

## 2020-09-30 NOTE — ASSESSMENT & PLAN NOTE
Results for BABS QUINONES (MRN [id number]) as of 9/30/2020 08:58   Ref. Range 8/27/2020 06:30   Cholesterol,Tot Latest Ref Range: 100 - 199 mg/dL 231 (H)   Triglycerides Latest Ref Range: 0 - 149 mg/dL 106   HDL Latest Ref Range: >=40 mg/dL 61   LDL Latest Ref Range: <100 mg/dL 149 (H)   The 10-year ASCVD risk score (Chadd SETHI Jr., et al., 2013) is: 1.9%    Values used to calculate the score:      Age: 55 years      Sex: Female      Is Non- : No      Diabetic: No      Tobacco smoker: No      Systolic Blood Pressure: 120 mmHg      Is BP treated: No      HDL Cholesterol: 61 mg/dL      Total Cholesterol: 231 mg/dL   Dad with high cholesterol. Not a smoker, BP is normal, no Hx of stroke or heart attacks. No CP, SOB, peripheral edema.

## 2020-09-30 NOTE — ASSESSMENT & PLAN NOTE
New problem today.  Patient reports chronic, intermittent palpitations for years.  This was initially explored a few decades ago, cardiac work-up was negative.  She denies CP, shortness of breath, headaches or lower extremity swelling.

## 2020-09-30 NOTE — ASSESSMENT & PLAN NOTE
Known problem. Pt reports dizziness episodes x 2 weekly, initially noticed 1 yr ago.  She notes dizziness when changing positions from laying to sitting or standing, lasting a few seconds and resolves.  Also experiencing intermittent occasional heart palpitations since she is a teenager.  She denies excessive stress, non-smoker, eats regularly and well-hydrated.  He exercises daily.  Her recent labs hemodynamically stable, no anemia, no DM, normal BP.  She takes no medications except for estradiol patch, no supplements.  She does have elevated total and LDL cholesterols with family history of high cholesterol in father.  No family history of strokes or heart attacks.  No changes in vision, seeing ophthalmologist annually.  She denies CP, shortness of breath, headaches or peripheral edema.  She would like to go ahead and obtain carotid ultrasound and echo.  Orthostatics test negative today.

## 2020-09-30 NOTE — PROGRESS NOTES
Chief Complaint   Patient presents with   • Dizziness       HISTORY OF PRESENT ILLNESS: Patient is a 55 y.o. female, established patient who presents today to discuss medical problems as listed below:    Health Maintenance:  COMPLETED     Mixed hyperlipidemia  Results for BABS QUINONES (MRN [id number]) as of 9/30/2020 08:58   Ref. Range 8/27/2020 06:30   Cholesterol,Tot Latest Ref Range: 100 - 199 mg/dL 231 (H)   Triglycerides Latest Ref Range: 0 - 149 mg/dL 106   HDL Latest Ref Range: >=40 mg/dL 61   LDL Latest Ref Range: <100 mg/dL 149 (H)   The 10-year ASCVD risk score (Chadd SETHI Jr., et al., 2013) is: 1.9%    Values used to calculate the score:      Age: 55 years      Sex: Female      Is Non- : No      Diabetic: No      Tobacco smoker: No      Systolic Blood Pressure: 120 mmHg      Is BP treated: No      HDL Cholesterol: 61 mg/dL      Total Cholesterol: 231 mg/dL   Dad with high cholesterol. Not a smoker, BP is normal, no Hx of stroke or heart attacks. No CP, SOB, peripheral edema.    Episode of dizziness  Known problem. Pt reports dizziness episodes x 2 weekly, initially noticed 1 yr ago.  She notes dizziness when changing positions from laying to sitting or standing, lasting a few seconds and resolves.  Also experiencing intermittent occasional heart palpitations since she is a teenager.  She denies excessive stress, non-smoker, eats regularly and well-hydrated.  He exercises daily.  Her recent labs hemodynamically stable, no anemia, no DM, normal BP.  She takes no medications except for estradiol patch, no supplements.  She does have elevated total and LDL cholesterols with family history of high cholesterol in father.  No family history of strokes or heart attacks.  No changes in vision, seeing ophthalmologist annually.  She denies CP, shortness of breath, headaches or peripheral edema.  She would like to go ahead and obtain carotid ultrasound and echo.  Orthostatics test negative  today.    Palpitations  New problem today.  Patient reports chronic, intermittent palpitations for years.  This was initially explored a few decades ago, cardiac work-up was negative.  She denies CP, shortness of breath, headaches or lower extremity swelling.      Patient Active Problem List    Diagnosis Date Noted   • Mixed hyperlipidemia 09/30/2020   • Palpitations 09/30/2020   • Episode of dizziness 08/05/2020   • Uterine prolapse 07/23/2013        Allergies: Cleocin [clindamycin hcl]    Current Outpatient Medications   Medication Sig Dispense Refill   • vitamin D, Ergocalciferol, (DRISDOL) 1.25 MG (12514 UT) Cap capsule Take 1 Cap by mouth every 7 days. 12 Cap 1     No current facility-administered medications for this visit.        Social History     Tobacco Use   • Smoking status: Never Smoker   • Smokeless tobacco: Never Used   Substance Use Topics   • Alcohol use: Yes     Comment: 1-2 per week   • Drug use: No     Social History     Social History Narrative   • Not on file       Family History   Problem Relation Age of Onset   • Stroke Paternal Grandfather        Allergies, past medical history, past surgical history, family history, social history reviewed and updated.    Review of Systems:     - Constitutional: Negative for fever, chills, unexpected weight change, and fatigue/generalized weakness.     - HEENT: Negative for headaches, vision changes, hearing changes, ear pain, ear discharge, rhinorrhea, sinus congestion, sore throat, and neck pain.      - Respiratory: Negative for cough, sputum production, chest congestion, dyspnea, wheezing, and crackles.      - Cardiovascular: Negative for chest pain, palpitations, orthopnea, and bilateral lower extremity edema.      - Neurological: Dizziness    - Endo/Heme/Allergies: Does not bruise/bleed easily.     - Psychiatric/Behavioral: Negative for depression, suicidal/homicidal ideation and memory loss.      All other systems reviewed and are negative    Exam:   "  /80 (BP Location: Left arm, Patient Position: Standing, BP Cuff Size: Adult)   Pulse 64   Temp 36.6 °C (97.9 °F) (Temporal)   Ht 1.651 m (5' 5\")   Wt 67.6 kg (149 lb)   LMP 06/29/2013   SpO2 97%   BMI 24.79 kg/m²  Body mass index is 24.79 kg/m².    Physical Exam:  Constitutional: Well-developed and well-nourished. Not diaphoretic. No distress.   Eyes: Conjunctivae and extraocular motions are normal. Pupils are equal, round, and reactive to light. No scleral icterus.   Neck: No carotid bruits noted  Cardiovascular: Regular rate and rhythm, S1 and S2 without murmur, rubs, or gallops.    Chest: Effort normal. Clear to auscultation throughout. No adventitious sounds.  Neurological: Alert and oriented x 3. DTRs 2+/3 and symmetric. No cranial nerve deficit. 5/5 myotomes. Sensation intact. Negative Rhomberg.  Good strength in all 4 extremities.  Psychiatric:  Behavior, mood, and affect are appropriate.  MA/nursing note and vitals reviewed.    LABS:  8/27 results reviewed and discussed with the patient, questions answered.    Patient was seen for 25 minutes face to face of which > 50% of appointment time was spent on counseling and coordination of care regarding the above.     Assessment/Plan:  1. Vitamin D deficiency  - vitamin D, Ergocalciferol, (DRISDOL) 1.25 MG (57808 UT) Cap capsule; Take 1 Cap by mouth every 7 days.  Dispense: 12 Cap; Refill: 1    2. Need for vaccination  - Influenza Vaccine Quad Injection (PF)    3. Mixed hyperlipidemia  Uncontrolled, stable.  Discussed etiology and potential risk factors.  Patient declines the need for Rx today.  She would like to improve her lifestyle such as diet and daily exercise.  She does admit to eating little too much red meat.  We will recheck lipid profile in 3 months.  - Lipid Profile; Future    4. Dizziness  Uncontrolled, stable.  Patient thinks that her symptoms are improving.  She does have a risk factor of elevated cholesterol, high cholesterol and " father, and postmenopausal state.  Her ASCVD risk is 1.9%.  Orthostatic test is negative.  We will go ahead and obtain the following tests.  Recommend taking slower time when changing positions from laying to standing, keep well-hydrated and having a healthy snack periodically.  Will obtain test results and discuss findings with patient.  - EC-ECHOCARDIOGRAM COMPLETE W/O CONT; Future  - US-CAROTID DOPPLER BILAT; Future    5. Palpitations  - EC-ECHOCARDIOGRAM COMPLETE W/O CONT; Future       Discussed with patient possible alternative diagnoses, pt is to take all medications as prescribed. If symptoms persist FU w/PCP, if symptoms worsen go to emergency room. If experiencing any side effects from prescribed medications reports to the office immediately or go to emergency room.  Reviewed indication, dosage, usage and potential adverse effects of prescribed medications. Reviewed risks and benefits of treatment plan. Patient verbalizes understanding of all instruction and verbally agrees to plan.    Return if symptoms worsen or fail to improve.

## 2020-10-27 ENCOUNTER — HOSPITAL ENCOUNTER (OUTPATIENT)
Dept: RADIOLOGY | Facility: MEDICAL CENTER | Age: 55
End: 2020-10-27
Attending: NURSE PRACTITIONER
Payer: COMMERCIAL

## 2020-10-27 ENCOUNTER — HOSPITAL ENCOUNTER (OUTPATIENT)
Dept: CARDIOLOGY | Facility: MEDICAL CENTER | Age: 55
End: 2020-10-27
Attending: NURSE PRACTITIONER
Payer: COMMERCIAL

## 2020-10-27 DIAGNOSIS — R00.2 PALPITATIONS: ICD-10-CM

## 2020-10-27 DIAGNOSIS — R42 DIZZINESS: ICD-10-CM

## 2020-10-27 LAB
LV EJECT FRACT  99904: 65
LV EJECT FRACT MOD 2C 99903: 66.2
LV EJECT FRACT MOD 4C 99902: 56.78
LV EJECT FRACT MOD BP 99901: 62.55

## 2020-10-27 PROCEDURE — 93306 TTE W/DOPPLER COMPLETE: CPT

## 2020-10-27 PROCEDURE — 93306 TTE W/DOPPLER COMPLETE: CPT | Mod: 26 | Performed by: INTERNAL MEDICINE

## 2020-10-27 PROCEDURE — 93880 EXTRACRANIAL BILAT STUDY: CPT

## 2020-12-02 ENCOUNTER — HOSPITAL ENCOUNTER (OUTPATIENT)
Dept: RADIOLOGY | Facility: MEDICAL CENTER | Age: 55
End: 2020-12-02
Attending: OBSTETRICS & GYNECOLOGY
Payer: COMMERCIAL

## 2020-12-02 DIAGNOSIS — Z12.31 ENCOUNTER FOR SCREENING MAMMOGRAM FOR HIGH-RISK PATIENT: ICD-10-CM

## 2020-12-02 PROCEDURE — 77067 SCR MAMMO BI INCL CAD: CPT

## 2021-01-05 ENCOUNTER — APPOINTMENT (OUTPATIENT)
Dept: MEDICAL GROUP | Facility: PHYSICIAN GROUP | Age: 56
End: 2021-01-05
Payer: COMMERCIAL

## 2021-01-12 ENCOUNTER — HOSPITAL ENCOUNTER (OUTPATIENT)
Dept: LAB | Facility: MEDICAL CENTER | Age: 56
End: 2021-01-12
Attending: NURSE PRACTITIONER
Payer: COMMERCIAL

## 2021-01-12 DIAGNOSIS — E78.2 MIXED HYPERLIPIDEMIA: ICD-10-CM

## 2021-01-12 LAB
CHOLEST SERPL-MCNC: 225 MG/DL (ref 100–199)
FASTING STATUS PATIENT QL REPORTED: NORMAL
HDLC SERPL-MCNC: 58 MG/DL
LDLC SERPL CALC-MCNC: 147 MG/DL
TRIGL SERPL-MCNC: 102 MG/DL (ref 0–149)

## 2021-01-12 PROCEDURE — 36415 COLL VENOUS BLD VENIPUNCTURE: CPT

## 2021-01-12 PROCEDURE — 80061 LIPID PANEL: CPT

## 2021-01-15 ENCOUNTER — OFFICE VISIT (OUTPATIENT)
Dept: MEDICAL GROUP | Facility: PHYSICIAN GROUP | Age: 56
End: 2021-01-15
Payer: COMMERCIAL

## 2021-01-15 VITALS
RESPIRATION RATE: 12 BRPM | HEIGHT: 65 IN | BODY MASS INDEX: 25.05 KG/M2 | OXYGEN SATURATION: 99 % | HEART RATE: 70 BPM | WEIGHT: 150.35 LBS | DIASTOLIC BLOOD PRESSURE: 70 MMHG | SYSTOLIC BLOOD PRESSURE: 110 MMHG | TEMPERATURE: 98 F

## 2021-01-15 DIAGNOSIS — E78.2 MIXED HYPERLIPIDEMIA: ICD-10-CM

## 2021-01-15 DIAGNOSIS — Z98.890 HISTORY OF LEFT KNEE SURGERY: ICD-10-CM

## 2021-01-15 PROCEDURE — 99214 OFFICE O/P EST MOD 30 MIN: CPT | Performed by: NURSE PRACTITIONER

## 2021-01-15 SDOH — HEALTH STABILITY: MENTAL HEALTH: HOW OFTEN DO YOU HAVE A DRINK CONTAINING ALCOHOL?: 2-4 TIMES A MONTH

## 2021-01-15 SDOH — HEALTH STABILITY: MENTAL HEALTH: HOW MANY STANDARD DRINKS CONTAINING ALCOHOL DO YOU HAVE ON A TYPICAL DAY?: 1 OR 2

## 2021-01-15 ASSESSMENT — FIBROSIS 4 INDEX: FIB4 SCORE: 0.78

## 2021-01-15 ASSESSMENT — PATIENT HEALTH QUESTIONNAIRE - PHQ9: CLINICAL INTERPRETATION OF PHQ2 SCORE: 0

## 2021-01-15 NOTE — ASSESSMENT & PLAN NOTE
New problem. Hx of L knee Sx in 1995 after skiing accident. ACL reconstruction.  Patient is requesting referral to PT, [facility].  She needs no imaging today as per she feels PT therapy is adequate for at this time.

## 2021-01-15 NOTE — PROGRESS NOTES
Chief Complaint   Patient presents with   • Follow-Up   • Lab Results       HISTORY OF PRESENT ILLNESS: Patient is a 55 y.o. female, established patient who presents today to discuss medical problems as listed below:    Health Maintenance:  COMPLETED     History of left knee surgery  New problem. Hx of L knee Sx in 1995 after skiing accident. ACL reconstruction.  Patient is requesting referral to PT, [facility].  She needs no imaging today as per she feels PT therapy is adequate for at this time.    Mixed hyperlipidemia  Results for BABS QUINONES (MRN [id number]) as of 1/15/2021 10:36   Ref. Range 1/12/2021 08:19   Cholesterol,Tot Latest Ref Range: 100 - 199 mg/dL 225 (H)   Triglycerides Latest Ref Range: 0 - 149 mg/dL 102   HDL Latest Ref Range: >=40 mg/dL 58   LDL Latest Ref Range: <100 mg/dL 147 (H)   Slight decrease in LDL from previous value.  Patient admits to consuming excessive amounts of red meat up to 5 times a week.  She is non-smoker nondiabetic.  No FH of stroke or heart attacks.  She denies CP, dizziness, dyspnea, peripheral edema.  Statin and not interested in statin.      Patient Active Problem List    Diagnosis Date Noted   • History of left knee surgery 01/15/2021   • Mixed hyperlipidemia 09/30/2020   • Palpitations 09/30/2020   • Episode of dizziness 08/05/2020   • Uterine prolapse 07/23/2013        Allergies: Cleocin [clindamycin hcl]    Current Outpatient Medications   Medication Sig Dispense Refill   • vitamin D, Ergocalciferol, (DRISDOL) 1.25 MG (18529 UT) Cap capsule Take 1 Cap by mouth every 7 days. 12 Cap 1   • estradiol (CLIMARA) 0.0375 MG/24HR patch Apply 1 Patch to skin as directed every 7 days.       No current facility-administered medications for this visit.        Social History     Tobacco Use   • Smoking status: Never Smoker   • Smokeless tobacco: Never Used   Substance Use Topics   • Alcohol use: Yes     Frequency: 2-4 times a month     Drinks per session: 1 or 2     Comment: 1-2  "per week   • Drug use: No     Social History     Social History Narrative   • Not on file       Family History   Problem Relation Age of Onset   • Stroke Paternal Grandfather        Allergies, past medical history, past surgical history, family history, social history reviewed and updated.    Review of Systems:     - Constitutional: Negative for fever, chills, unexpected weight change, and fatigue/generalized weakness.     - Respiratory: Negative for cough, sputum production, chest congestion, dyspnea, wheezing, and crackles.      - Cardiovascular: Negative for chest pain, palpitations, orthopnea, and bilateral lower extremity edema.     - Psychiatric/Behavioral: Negative for depression, suicidal/homicidal ideation and memory loss.      All other systems reviewed and are negative    Exam:    /70 (BP Location: Left arm, Patient Position: Sitting, BP Cuff Size: Adult)   Pulse 70   Temp 36.7 °C (98 °F) (Temporal)   Resp 12   Ht 1.651 m (5' 5\")   Wt 68.2 kg (150 lb 5.7 oz)   LMP 06/29/2013   SpO2 99%   BMI 25.02 kg/m²  Body mass index is 25.02 kg/m².    Physical Exam:  Constitutional: Well-developed and well-nourished. Not diaphoretic. No distress.   Cardiovascular: Regular rate and rhythm, S1 and S2 without murmur, rubs, or gallops.    Chest: Effort normal. Clear to auscultation throughout. No adventitious sounds.   Neurological: Alert and oriented x 3.  Psychiatric:  Behavior, mood, and affect are appropriate.  MA/nursing note and vitals reviewed.    LABS: 1/12  results reviewed and discussed with the patient, questions answered.    Patient was seen for 25 minutes face to face of which > 50% of appointment time was spent on counseling and coordination of care regarding the above.     Assessment/Plan:  1. Mixed hyperlipidemia  Uncontrolled, stable.  Patient is not interested in Rx prescription today.  She would like to consider improving her diet.  Thorough discussion of healthy diet and benefits of " exercise daily.  Recommend avoid excessive saturated fats, red meats, cheese, egg yolks.  Increase daily fiber intake and take fish oil nightly.  We will repeat labs in 3 months.  - Lipid Profile; Future    2. History of left knee surgery  - REFERRAL TO PHYSICAL THERAPY       Discussed with patient possible alternative diagnoses, pt is to take all medications as prescribed. If symptoms persist FU w/PCP, if symptoms worsen go to emergency room. If experiencing any side effects from prescribed medications reports to the office immediately or go to emergency room.  Reviewed indication, dosage, usage and potential adverse effects of prescribed medications. Reviewed risks and benefits of treatment plan. Patient verbalizes understanding of all instruction and verbally agrees to plan.    No follow-ups on file.

## 2021-01-15 NOTE — ASSESSMENT & PLAN NOTE
Results for YOUNGBABS (MRN [id number]) as of 1/15/2021 10:36   Ref. Range 1/12/2021 08:19   Cholesterol,Tot Latest Ref Range: 100 - 199 mg/dL 225 (H)   Triglycerides Latest Ref Range: 0 - 149 mg/dL 102   HDL Latest Ref Range: >=40 mg/dL 58   LDL Latest Ref Range: <100 mg/dL 147 (H)   Slight decrease in LDL from previous value.  Patient admits to consuming excessive amounts of red meat up to 5 times a week.  She is non-smoker nondiabetic.  No FH of stroke or heart attacks.  She denies CP, dizziness, dyspnea, peripheral edema.  Statin and not interested in statin.

## 2021-03-15 DIAGNOSIS — Z23 NEED FOR VACCINATION: ICD-10-CM

## 2021-03-27 ENCOUNTER — OFFICE VISIT (OUTPATIENT)
Dept: URGENT CARE | Facility: CLINIC | Age: 56
End: 2021-03-27
Payer: COMMERCIAL

## 2021-03-27 VITALS
OXYGEN SATURATION: 99 % | SYSTOLIC BLOOD PRESSURE: 130 MMHG | BODY MASS INDEX: 25.14 KG/M2 | WEIGHT: 150.9 LBS | DIASTOLIC BLOOD PRESSURE: 72 MMHG | RESPIRATION RATE: 16 BRPM | HEIGHT: 65 IN | HEART RATE: 82 BPM | TEMPERATURE: 98 F

## 2021-03-27 DIAGNOSIS — J02.9 PHARYNGITIS, UNSPECIFIED ETIOLOGY: ICD-10-CM

## 2021-03-27 DIAGNOSIS — T78.40XA ALLERGIC REACTION, INITIAL ENCOUNTER: ICD-10-CM

## 2021-03-27 LAB
INT CON NEG: NORMAL
INT CON POS: NORMAL
S PYO AG THROAT QL: NEGATIVE

## 2021-03-27 PROCEDURE — 99213 OFFICE O/P EST LOW 20 MIN: CPT | Performed by: PHYSICIAN ASSISTANT

## 2021-03-27 PROCEDURE — 87880 STREP A ASSAY W/OPTIC: CPT | Performed by: PHYSICIAN ASSISTANT

## 2021-03-27 RX ORDER — METHYLPREDNISOLONE 4 MG/1
TABLET ORAL
Qty: 21 TABLET | Refills: 0 | Status: SHIPPED | OUTPATIENT
Start: 2021-03-27 | End: 2021-03-31

## 2021-03-27 ASSESSMENT — ENCOUNTER SYMPTOMS
ABDOMINAL PAIN: 0
SORE THROAT: 1
HEADACHES: 0
DIZZINESS: 0
FACIAL SWELLING: 1
COUGH: 0
BLURRED VISION: 0
CHILLS: 0
VOMITING: 0
MYALGIAS: 0
SHORTNESS OF BREATH: 0
STRIDOR: 0
SINUS PAIN: 0
EYE PAIN: 0
FEVER: 0
NAUSEA: 0
PALPITATIONS: 0
DIARRHEA: 0

## 2021-03-27 ASSESSMENT — FIBROSIS 4 INDEX: FIB4 SCORE: 0.78

## 2021-03-27 NOTE — PROGRESS NOTES
"Subjective:      Bibiana Cano is a 55 y.o. female who presents with Allergic Reaction (since last night) and Facial Swelling (lower lip and mouth )    HPI:  This is a new problem. Antoinette Cano is a 55 y.o. female who presents today for evaluation of a possible allergic reaction.  Patient states that shortly after eating some heavily last night she noticed that her mouth started to go \"numb\".  She says that she had some mild irritation in her mouth and throat but no swelling or throat and no trouble breathing.  She woke up this morning she noticed that she had some facial swelling in both of her cheeks as well as her bottom lip and her throat was starting to feel a little bit \"irritated\" and swollen.  She says that all the swelling has gone down considerably since this morning.  She has not taken any medications for symptoms.  She has no known allergies to any foods.  She has never had a reaction like that before.  She is not having any stridor, trismus, difficulty breathing or swallowing, chest pain, or lightheadedness/dizziness.  No rash      Review of Systems   Constitutional: Negative for chills, fever and malaise/fatigue.   HENT: Positive for facial swelling and sore throat. Negative for congestion, ear pain and sinus pain.    Eyes: Negative for blurred vision and pain.   Respiratory: Negative for cough, shortness of breath and stridor.    Cardiovascular: Negative for chest pain and palpitations.   Gastrointestinal: Negative for abdominal pain, diarrhea, nausea and vomiting.   Musculoskeletal: Negative for myalgias.   Skin: Negative for rash.   Neurological: Negative for dizziness and headaches.       PMH:  has a past medical history of Dental disorder (4/13), Indigestion, Infected puncture wound of plantar aspect of foot (6/23/13), and Other specified symptom associated with female genital organs.  MEDS:   Current Outpatient Medications:   •  vitamin D, Ergocalciferol, (DRISDOL) 1.25 MG (74740 UT) " Cap capsule, Take 1 Cap by mouth every 7 days., Disp: 12 Cap, Rfl: 1  •  estradiol (CLIMARA) 0.0375 MG/24HR patch, Apply 1 Patch to skin as directed every 7 days., Disp: , Rfl:   ALLERGIES:   Allergies   Allergen Reactions   • Cleocin [Clindamycin Hcl] Vomiting     SURGHX:   Past Surgical History:   Procedure Laterality Date   • CYSTOSCOPY  7/23/2013    Performed by Cory Medeiros M.D. at SURGERY SAME DAY [facility]   • VAGINAL HYSTERECTOMY SCOPE TOTAL  7/23/2013    Performed by Cory Medeiros M.D. at SURGERY SAME DAY [facility]   • HYSTEROSCOPY WITH VIDEO OPERATIVE  2/7/2012    Performed by CORY MEDEIROS at SURGERY SAME DAY [facility]   • DILATION AND CURETTAGE  2/7/2012    Performed by CORY MEDEIROS at SURGERY SAME DAY [facility]   • OTHER  2010    tooth implant   • OTHER  2009    tooth extraction   • OTHER  2008    tooth extraction   • OTHER      LT knee surg     SOCHX:  reports that she has never smoked. She has never used smokeless tobacco. She reports current alcohol use. She reports that she does not use drugs.  FH: Family history was reviewed, no pertinent findings to report     Objective:     LMP 06/29/2013      Physical Exam  Constitutional:       Appearance: She is well-developed.   HENT:      Head: Normocephalic and atraumatic.      Comments: No notable facial swelling or angioedema on exam.  No evidence of abscess ulcers or cold sores in the mouth or on the lips.     Right Ear: External ear normal.      Left Ear: External ear normal.      Mouth/Throat:      Lips: Pink.      Mouth: Mucous membranes are moist.      Pharynx: Uvula midline. Posterior oropharyngeal erythema present. No uvula swelling.   Eyes:      Conjunctiva/sclera: Conjunctivae normal.      Pupils: Pupils are equal, round, and reactive to light.   Cardiovascular:      Rate and Rhythm: Normal rate and regular rhythm.      Heart sounds: Normal heart sounds. No murmur.   Pulmonary:      Effort: Pulmonary effort is  normal.      Breath sounds: Normal breath sounds. No decreased breath sounds, wheezing, rhonchi or rales.   Musculoskeletal:      Cervical back: Full passive range of motion without pain and normal range of motion.   Lymphadenopathy:      Cervical: No cervical adenopathy.   Skin:     General: Skin is warm and dry.      Capillary Refill: Capillary refill takes less than 2 seconds.   Neurological:      Mental Status: She is alert and oriented to person, place, and time.   Psychiatric:         Behavior: Behavior normal.         Judgment: Judgment normal.         POCT Rapid Strep A - Negative     Assessment/Plan:     1. Allergic reaction, initial encounter  - methylPREDNISolone (MEDROL DOSEPAK) 4 MG Tablet Therapy Pack; Follow schedule on package instructions.  Dispense: 21 tablet; Refill: 0    2. Pharyngitis, unspecified etiology  - POCT Rapid Strep A      Rapid strep is negative. Physical exam is largely unremarkable with the exception of some mild oropharyngeal erythema.  Her symptoms, however, do sound congruent with a allergic reaction.  At this point, with most of the symptoms calm down on her own I recommend that she do twice daily dosing of Zyrtec for the next 3 days and then continue with once daily dosing for a total of 10 days.  She will be given a contingent prescription for methylprednisolone to fill if any of her symptoms start to worsen again.  She has a follow-up appointment with her PCP scheduled for 3/31.          Differential Diagnosis, natural history, and supportive care discussed. Return to the Urgent Care or follow up with your PCP if symptoms fail to resolve, or for any new or worsening symptoms. Emergency room precautions discussed. Patient and/or family appears understanding of information.

## 2021-03-31 ENCOUNTER — TELEMEDICINE (OUTPATIENT)
Dept: MEDICAL GROUP | Facility: PHYSICIAN GROUP | Age: 56
End: 2021-03-31
Payer: COMMERCIAL

## 2021-03-31 VITALS — BODY MASS INDEX: 24.99 KG/M2 | WEIGHT: 150 LBS | HEIGHT: 65 IN

## 2021-03-31 DIAGNOSIS — M25.512 PAIN IN SHOULDER REGION, LEFT: ICD-10-CM

## 2021-03-31 DIAGNOSIS — T78.40XD ALLERGIC REACTION, SUBSEQUENT ENCOUNTER: ICD-10-CM

## 2021-03-31 PROBLEM — T78.40XA ALLERGIC REACTION: Status: ACTIVE | Noted: 2021-03-31

## 2021-03-31 PROCEDURE — 99214 OFFICE O/P EST MOD 30 MIN: CPT | Mod: 95,CR | Performed by: NURSE PRACTITIONER

## 2021-03-31 RX ORDER — EPINEPHRINE 0.3 MG/.3ML
0.3 INJECTION SUBCUTANEOUS ONCE
Qty: 1 EACH | Refills: 3 | Status: SHIPPED | OUTPATIENT
Start: 2021-03-31 | End: 2021-03-31

## 2021-03-31 ASSESSMENT — FIBROSIS 4 INDEX: FIB4 SCORE: 0.78

## 2021-04-01 NOTE — PROGRESS NOTES
Telemedicine Visit: Established Patient     This encounter was conducted via Zoom.   Verbal consent was obtained. Patient's identity was verified.    Subjective:     Chief Complaint   Patient presents with   • Referral Needed     PT   • Numbness     due to allergic reaction to food/swelling in cheeks     Antoinette Cano is a 55 y.o. female presenting for evaluation and management of following problems:    Pain in shoulder region, left  New problem today.  Pain in left shoulder initially presented couple of years, waxes and wanes. Three months ago patient started feeling tenderness in the frontal scalp part of the shoulder.  She has been reading in them sitting position for the last few months which potentially could have exacerbated this pain.  She denies snapping a scratching or popping noise, pain is nonradiating.  Denies paresthesia or numbness in the arm.  Patient reports reduced range of motion.  Requesting referral to PT today.    Allergic reaction  New problem.  Patient was seen at urgent care on 3/27/2020 for an allergic reaction after consuming baked goods that her daughter made.  Her symptoms included numbness around the mouth, lips and lower jaw but some localized edema the next morning.  Symptoms resolved completely.  Patient started taking Zyrtec.  Patient was prescribed Medrol pack which she did not take.  This patient is unable to identified the causative agent.  Patient has no history of allergies to any foods.  Patient was vaccinated with Materna Covid vaccine 2 weeks prior without adverse reactions.  She denies CP, dyspnea, dizziness or fevers.        ROS   Denies any recent fevers or chills. No nausea or vomiting. No chest pains or shortness of breath.     Allergies   Allergen Reactions   • Cleocin [Clindamycin Hcl] Vomiting       Current medicines (including changes today)  Current Outpatient Medications   Medication Sig Dispense Refill   • diclofenac sodium 1 % Gel Place 1 g on the skin 3  "times a day. 100 g 1   • EPINEPHrine (EPIPEN) 0.3 MG/0.3ML Solution Auto-injector solution for injection Inject 0.3 mL into the shoulder, thigh, or buttocks one time for 1 dose. 1 Each 3   • vitamin D, Ergocalciferol, (DRISDOL) 1.25 MG (55780 UT) Cap capsule Take 1 Cap by mouth every 7 days. 12 Cap 1   • estradiol (CLIMARA) 0.0375 MG/24HR patch Apply 1 Patch to skin as directed every 7 days.       No current facility-administered medications for this visit.       Patient Active Problem List    Diagnosis Date Noted   • Pain in shoulder region, left 03/31/2021   • Allergic reaction 03/31/2021   • History of left knee surgery 01/15/2021   • Mixed hyperlipidemia 09/30/2020   • Palpitations 09/30/2020   • Episode of dizziness 08/05/2020   • Uterine prolapse 07/23/2013       Family History   Problem Relation Age of Onset   • Stroke Paternal Grandfather        She  has a past medical history of Dental disorder (4/13), Indigestion, Infected puncture wound of plantar aspect of foot (6/23/13), and Other specified symptom associated with female genital organs.  She  has a past surgical history that includes other; other (2009); other (2010); other (2008); hysteroscopy with video operative (2/7/2012); dilation and curettage (2/7/2012); cystoscopy (7/23/2013); and vaginal hysterectomy scope total (7/23/2013).       Objective:   Vitals obtained by patient:  Ht 1.651 m (5' 5\")   Wt 68 kg (150 lb)   LMP 06/29/2013   BMI 24.96 kg/m²      Physical Exam:  General: No acute distress. Well nourished.   HEENT: EOM grossly intact, no scleral icterus, no pharyngeal erythema.   Neck:  No JVD noted at 90 degrees, trachea midline  CVS: Pulse as reported by patient, no visible LE edema.  Resp: Unlabored respiratory effort, no cough or audible wheeze  MSK/Ext: No clubbing or cyanosis visible appreciated.  Skin: No rashes in visible areas.  Neurological: AOx3. CN III-XII grossly intact. No focal deficits.     My total time spent caring for " the patient on the day of the encounter was 25 minutes.   This does not include time spent on separately billable procedures/tests.   Assessment and Plan:   1. Pain in shoulder region, left  Uncontrolled, stable.  Trial of PT and Voltaren gel.  Follow-up in 4 to 6 weeks.  If problem not improving or resolved, will obtain imaging.  - REFERRAL TO PHYSICAL THERAPY  - diclofenac sodium 1 % Gel; Place 1 g on the skin 3 times a day.  Dispense: 100 g; Refill: 1  2. Allergic reaction, subsequent encounter  Resolved.  Will send for comprehensive allergy evaluation and testing.  - REFERRAL TO ALLERGY  -  EPINEPHrine (EPIPEN) 0.3 MG/0.3ML Solution Auto-injector solution for injection; Inject 0.3 mL into the shoulder, thigh, or buttocks one time for 1 dose.  Dispense: 1 Each; Refill: 3           Follow-up: No follow-ups on file.

## 2021-04-01 NOTE — ASSESSMENT & PLAN NOTE
New problem today.  Pain in left shoulder initially presented couple of years, waxes and wanes. Three months ago patient started feeling tenderness in the frontal scalp part of the shoulder.  She has been reading in them sitting position for the last few months which potentially could have exacerbated this pain.  She denies snapping a scratching or popping noise, pain is nonradiating.  Denies paresthesia or numbness in the arm.  Patient reports reduced range of motion.  Requesting referral to PT today.

## 2021-04-01 NOTE — ASSESSMENT & PLAN NOTE
New problem.  Patient was seen at urgent care on 3/27/2020 for an allergic reaction after consuming baked goods that her daughter made.  Her symptoms included numbness around the mouth, lips and lower jaw but some localized edema the next morning.  Symptoms resolved completely.  Patient started taking Zyrtec.  Patient was prescribed Medrol pack which she did not take.  This patient is unable to identified the causative agent.  Patient has no history of allergies to any foods.  Patient was vaccinated with Materna Covid vaccine 2 weeks prior without adverse reactions.  She denies CP, dyspnea, dizziness or fevers.

## 2021-04-05 ENCOUNTER — HOSPITAL ENCOUNTER (OUTPATIENT)
Dept: LAB | Facility: MEDICAL CENTER | Age: 56
End: 2021-04-05
Attending: NURSE PRACTITIONER
Payer: COMMERCIAL

## 2021-04-05 DIAGNOSIS — E78.2 MIXED HYPERLIPIDEMIA: ICD-10-CM

## 2021-04-05 PROCEDURE — 36415 COLL VENOUS BLD VENIPUNCTURE: CPT

## 2021-04-05 PROCEDURE — 80061 LIPID PANEL: CPT

## 2021-04-06 LAB
CHOLEST SERPL-MCNC: 192 MG/DL (ref 100–199)
FASTING STATUS PATIENT QL REPORTED: NORMAL
HDLC SERPL-MCNC: 52 MG/DL
LDLC SERPL CALC-MCNC: 125 MG/DL
TRIGL SERPL-MCNC: 76 MG/DL (ref 0–149)

## 2021-04-15 ENCOUNTER — TELEMEDICINE (OUTPATIENT)
Dept: MEDICAL GROUP | Facility: PHYSICIAN GROUP | Age: 56
End: 2021-04-15
Payer: COMMERCIAL

## 2021-04-15 VITALS — WEIGHT: 150 LBS | BODY MASS INDEX: 24.99 KG/M2 | HEIGHT: 65 IN

## 2021-04-15 DIAGNOSIS — T78.40XD ALLERGIC REACTION, SUBSEQUENT ENCOUNTER: ICD-10-CM

## 2021-04-15 DIAGNOSIS — E78.2 MIXED HYPERLIPIDEMIA: ICD-10-CM

## 2021-04-15 PROCEDURE — 99213 OFFICE O/P EST LOW 20 MIN: CPT | Mod: 95,CR | Performed by: NURSE PRACTITIONER

## 2021-04-15 ASSESSMENT — FIBROSIS 4 INDEX: FIB4 SCORE: 0.78

## 2021-04-15 NOTE — PROGRESS NOTES
Telemedicine Visit: Established Patient     This encounter was conducted via Zoom.   Verbal consent was obtained. Patient's identity was verified.    Subjective:     Chief Complaint   Patient presents with   • Follow-Up     Antoinette Cano is a 55 y.o. female presenting for evaluation and management of following problems:    Mixed hyperlipidemia  Results for BABS CANO (MRN [id number]) as of 4/15/2021 13:24   Ref. Range 4/5/2021 07:03   Cholesterol,Tot Latest Ref Range: 100 - 199 mg/dL 192   Triglycerides Latest Ref Range: 0 - 149 mg/dL 76   HDL Latest Ref Range: >=40 mg/dL 52   LDL Latest Ref Range: <100 mg/dL 125 (H)   Significant improvement of HDL/LDL ratio but lifestyle modifications.  Patient has been avoiding excessive meat consumption.  She leads an active lifestyle and exercises every day.  Pertinent negatives are no CP, dyspnea, dizziness or peripheral edema.  Non-smoker    Allergic reaction  Patient is followed up with allergy testing and noted no identified allergens up-to-date.  She has EpiPen and carries Benadryl with her at all times.      ROS   Denies any recent fevers or chills. No nausea or vomiting. No chest pains or shortness of breath.     Allergies   Allergen Reactions   • Cleocin [Clindamycin Hcl] Vomiting       Current medicines (including changes today)  Current Outpatient Medications   Medication Sig Dispense Refill   • diclofenac sodium 1 % Gel Place 1 g on the skin 3 times a day. 100 g 1   • vitamin D, Ergocalciferol, (DRISDOL) 1.25 MG (21226 UT) Cap capsule Take 1 Cap by mouth every 7 days. 12 Cap 1   • estradiol (CLIMARA) 0.0375 MG/24HR patch Apply 1 Patch to skin as directed every 7 days.       No current facility-administered medications for this visit.       Patient Active Problem List    Diagnosis Date Noted   • Pain in shoulder region, left 03/31/2021   • Allergic reaction 03/31/2021   • History of left knee surgery 01/15/2021   • Mixed hyperlipidemia 09/30/2020   •  "Palpitations 09/30/2020   • Episode of dizziness 08/05/2020   • Uterine prolapse 07/23/2013       Family History   Problem Relation Age of Onset   • Stroke Paternal Grandfather        She  has a past medical history of Dental disorder (4/13), Indigestion, Infected puncture wound of plantar aspect of foot (6/23/13), and Other specified symptom associated with female genital organs.  She  has a past surgical history that includes other; other (2009); other (2010); other (2008); hysteroscopy with video operative (2/7/2012); dilation and curettage (2/7/2012); cystoscopy (7/23/2013); and vaginal hysterectomy scope total (7/23/2013).       Objective:   Vitals obtained by patient:  Ht 1.651 m (5' 5\")   Wt 68 kg (150 lb)   LMP 06/29/2013   BMI 24.96 kg/m²      Physical Exam:  General: No acute distress. Well nourished.   HEENT: EOM grossly intact, no scleral icterus, no pharyngeal erythema.   Neck:  No JVD noted at 90 degrees, trachea midline  CVS: Pulse as reported by patient, no visible LE edema.  Resp: Unlabored respiratory effort, no cough or audible wheeze  MSK/Ext: No clubbing or cyanosis visible appreciated.  Skin: No rashes in visible areas.  Neurological: AOx3. CN III-XII grossly intact. No focal deficits.     LABS: 4/2021  results reviewed and discussed with the patient, questions answered.    My total time spent caring for the patient on the day of the encounter was 15 minutes.   This does not include time spent on separately billable procedures/tests.   Assessment and Plan:   1. Mixed hyperlipidemia  Improved with lifestyle modification.  Given a high HDL at 52, I think this is reasonable to continue lifestyle modifications with diet and exercise.  Also recommend adequate fiber consumption 25 to 30 g daily and nightly fish oil 2 to 3 g.    2. Allergic reaction, subsequent encounter  Patient is following up with allergist.  Advised to have Benadryl and EpiPen with her at all times in case of emergencies.  " Avoid known triggers.       Follow-up: No follow-ups on file.

## 2021-04-15 NOTE — ASSESSMENT & PLAN NOTE
Patient is followed up with allergy testing and noted no identified allergens up-to-date.  She has EpiPen and carries Benadryl with her at all times.  She has no repeated episode since the initial.

## 2021-04-15 NOTE — ASSESSMENT & PLAN NOTE
Results for YOUNGBABS (MRN [id number]) as of 4/15/2021 13:24   Ref. Range 4/5/2021 07:03   Cholesterol,Tot Latest Ref Range: 100 - 199 mg/dL 192   Triglycerides Latest Ref Range: 0 - 149 mg/dL 76   HDL Latest Ref Range: >=40 mg/dL 52   LDL Latest Ref Range: <100 mg/dL 125 (H)   Significant improvement of HDL/LDL ratio but lifestyle modifications.  Patient has been avoiding excessive meat consumption.  She leads an active lifestyle and exercises every day.  Pertinent negatives are no CP, dyspnea, dizziness or peripheral edema.  Non-smoker

## 2021-05-03 DIAGNOSIS — E55.9 VITAMIN D DEFICIENCY: ICD-10-CM

## 2021-05-04 ENCOUNTER — HOSPITAL ENCOUNTER (OUTPATIENT)
Dept: LAB | Facility: MEDICAL CENTER | Age: 56
End: 2021-05-04
Attending: NURSE PRACTITIONER
Payer: COMMERCIAL

## 2021-05-04 PROCEDURE — 88184 FLOWCYTOMETRY/ TC 1 MARKER: CPT

## 2021-05-04 PROCEDURE — 82785 ASSAY OF IGE: CPT

## 2021-05-04 PROCEDURE — 84155 ASSAY OF PROTEIN SERUM: CPT

## 2021-05-04 PROCEDURE — 86200 CCP ANTIBODY: CPT

## 2021-05-04 PROCEDURE — 80053 COMPREHEN METABOLIC PANEL: CPT

## 2021-05-04 PROCEDURE — 86140 C-REACTIVE PROTEIN: CPT

## 2021-05-04 PROCEDURE — 86431 RHEUMATOID FACTOR QUANT: CPT

## 2021-05-04 PROCEDURE — 85025 COMPLETE CBC W/AUTO DIFF WBC: CPT

## 2021-05-04 PROCEDURE — 36415 COLL VENOUS BLD VENIPUNCTURE: CPT

## 2021-05-04 PROCEDURE — 86038 ANTINUCLEAR ANTIBODIES: CPT

## 2021-05-04 PROCEDURE — 86160 COMPLEMENT ANTIGEN: CPT | Mod: 91

## 2021-05-04 PROCEDURE — 86162 COMPLEMENT TOTAL (CH50): CPT

## 2021-05-04 PROCEDURE — 85652 RBC SED RATE AUTOMATED: CPT

## 2021-05-04 PROCEDURE — 83520 IMMUNOASSAY QUANT NOS NONAB: CPT

## 2021-05-04 PROCEDURE — 86160 COMPLEMENT ANTIGEN: CPT

## 2021-05-04 PROCEDURE — 84165 PROTEIN E-PHORESIS SERUM: CPT

## 2021-05-04 PROCEDURE — 81003 URINALYSIS AUTO W/O SCOPE: CPT

## 2021-05-04 PROCEDURE — 86161 COMPLEMENT/FUNCTION ACTIVITY: CPT

## 2021-05-04 RX ORDER — ERGOCALCIFEROL 1.25 MG/1
CAPSULE ORAL
Qty: 12 CAPSULE | Refills: 0 | Status: SHIPPED | OUTPATIENT
Start: 2021-05-04 | End: 2023-01-18

## 2021-05-05 LAB
ALBUMIN SERPL BCP-MCNC: 4.1 G/DL (ref 3.2–4.9)
ALBUMIN/GLOB SERPL: 1.6 G/DL
ALP SERPL-CCNC: 59 U/L (ref 30–99)
ALT SERPL-CCNC: 16 U/L (ref 2–50)
ANION GAP SERPL CALC-SCNC: 8 MMOL/L (ref 7–16)
APPEARANCE UR: CLEAR
AST SERPL-CCNC: 14 U/L (ref 12–45)
BASOPHILS # BLD AUTO: 1.1 % (ref 0–1.8)
BASOPHILS # BLD: 0.05 K/UL (ref 0–0.12)
BILIRUB SERPL-MCNC: 0.8 MG/DL (ref 0.1–1.5)
BILIRUB UR QL STRIP.AUTO: NEGATIVE
BUN SERPL-MCNC: 14 MG/DL (ref 8–22)
C3 SERPL-MCNC: 103.2 MG/DL (ref 87–200)
C4 SERPL-MCNC: 24.7 MG/DL (ref 19–52)
CALCIUM SERPL-MCNC: 8.9 MG/DL (ref 8.5–10.5)
CHLORIDE SERPL-SCNC: 107 MMOL/L (ref 96–112)
CO2 SERPL-SCNC: 25 MMOL/L (ref 20–33)
COLOR UR: YELLOW
CREAT SERPL-MCNC: 0.56 MG/DL (ref 0.5–1.4)
CRP SERPL HS-MCNC: <0.3 MG/DL (ref 0–0.75)
EOSINOPHIL # BLD AUTO: 0.21 K/UL (ref 0–0.51)
EOSINOPHIL NFR BLD: 4.6 % (ref 0–6.9)
ERYTHROCYTE [DISTWIDTH] IN BLOOD BY AUTOMATED COUNT: 40.2 FL (ref 35.9–50)
ERYTHROCYTE [SEDIMENTATION RATE] IN BLOOD BY WESTERGREN METHOD: 6 MM/HOUR (ref 0–25)
GLOBULIN SER CALC-MCNC: 2.5 G/DL (ref 1.9–3.5)
GLUCOSE SERPL-MCNC: 93 MG/DL (ref 65–99)
GLUCOSE UR STRIP.AUTO-MCNC: NEGATIVE MG/DL
HCT VFR BLD AUTO: 42.7 % (ref 37–47)
HGB BLD-MCNC: 14.4 G/DL (ref 12–16)
IMM GRANULOCYTES # BLD AUTO: 0.01 K/UL (ref 0–0.11)
IMM GRANULOCYTES NFR BLD AUTO: 0.2 % (ref 0–0.9)
KETONES UR STRIP.AUTO-MCNC: NEGATIVE MG/DL
LEUKOCYTE ESTERASE UR QL STRIP.AUTO: NEGATIVE
LYMPHOCYTES # BLD AUTO: 1.79 K/UL (ref 1–4.8)
LYMPHOCYTES NFR BLD: 38.8 % (ref 22–41)
MCH RBC QN AUTO: 30.3 PG (ref 27–33)
MCHC RBC AUTO-ENTMCNC: 33.7 G/DL (ref 33.6–35)
MCV RBC AUTO: 89.7 FL (ref 81.4–97.8)
MICRO URNS: NORMAL
MONOCYTES # BLD AUTO: 0.33 K/UL (ref 0–0.85)
MONOCYTES NFR BLD AUTO: 7.2 % (ref 0–13.4)
NEUTROPHILS # BLD AUTO: 2.22 K/UL (ref 2–7.15)
NEUTROPHILS NFR BLD: 48.1 % (ref 44–72)
NITRITE UR QL STRIP.AUTO: NEGATIVE
NRBC # BLD AUTO: 0 K/UL
NRBC BLD-RTO: 0 /100 WBC
PH UR STRIP.AUTO: 6.5 [PH] (ref 5–8)
PLATELET # BLD AUTO: 263 K/UL (ref 164–446)
PMV BLD AUTO: 10.6 FL (ref 9–12.9)
POTASSIUM SERPL-SCNC: 4.1 MMOL/L (ref 3.6–5.5)
PROT SERPL-MCNC: 6.6 G/DL (ref 6–8.2)
PROT UR QL STRIP: NEGATIVE MG/DL
RBC # BLD AUTO: 4.76 M/UL (ref 4.2–5.4)
RBC UR QL AUTO: NEGATIVE
RHEUMATOID FACT SER IA-ACNC: <10 IU/ML (ref 0–14)
SODIUM SERPL-SCNC: 140 MMOL/L (ref 135–145)
SP GR UR STRIP.AUTO: 1.02
UROBILINOGEN UR STRIP.AUTO-MCNC: 0.2 MG/DL
WBC # BLD AUTO: 4.6 K/UL (ref 4.8–10.8)

## 2021-05-06 LAB — CCP IGG SERPL-ACNC: 2 UNITS (ref 0–19)

## 2021-05-07 LAB
ALBUMIN 24H MFR UR ELPH: 99.1 %
ALPHA1 GLOB 24H MFR UR ELPH: 0 %
ALPHA2 GLOB 24H MFR UR ELPH: 0 %
B-GLOBULIN 24H MFR UR ELPH: 0.9 %
COLLECT DURATION TIME SPEC: NORMAL HRS
EER MONOCLONAL PROTEIN STUDY, 24 HOUR U Q5964: NORMAL
GAMMA GLOB 24H MFR UR ELPH: 0 %
IGE SERPL-ACNC: 80 KU/L
INTERPRETATION UR IFE-IMP: NORMAL
M PROTEIN 24H MFR UR ELPH: 0 %
M PROTEIN 24H UR ELPH-MRATE: NORMAL MG/24 HRS
NUCLEAR IGG SER QL IA: NORMAL
PROT 24H UR-MRATE: NORMAL MG/D (ref 40–150)
PROT UR-MCNC: 5 MG/DL
SPECIMEN VOL ?TM UR: NORMAL ML

## 2021-05-08 LAB
ALBUMIN SERPL ELPH-MCNC: 4.16 G/DL (ref 3.75–5.01)
ALPHA1 GLOB SERPL ELPH-MCNC: 0.26 G/DL (ref 0.19–0.46)
ALPHA2 GLOB SERPL ELPH-MCNC: 0.62 G/DL (ref 0.48–1.05)
B-GLOBULIN SERPL ELPH-MCNC: 0.59 G/DL (ref 0.48–1.1)
EER PROT ELECT SER Q1092: NORMAL
GAMMA GLOB SERPL ELPH-MCNC: 0.77 G/DL (ref 0.62–1.51)
INTERPRETATION SERPL IFE-IMP: NORMAL
PROT SERPL-MCNC: 6.4 G/DL (ref 6.3–8.2)

## 2021-05-12 LAB
C1INH FUNCTIONAL/C1INH TOTAL MFR SERPL: 91 %
C1INH SERPL-MCNC: 29 MG/DL (ref 21–39)
CH50 SERPL-ACNC: 54.6 U/ML (ref 38.7–89.9)

## 2021-05-13 LAB — TRYPTASE SERPL-MCNC: 5.4 UG/L

## 2021-05-26 LAB — URTIIND BASO ACT Q4770: 0 %

## 2022-09-26 ENCOUNTER — APPOINTMENT (OUTPATIENT)
Dept: RADIOLOGY | Facility: MEDICAL CENTER | Age: 57
End: 2022-09-26
Attending: OBSTETRICS & GYNECOLOGY
Payer: COMMERCIAL

## 2022-09-26 DIAGNOSIS — Z12.31 VISIT FOR SCREENING MAMMOGRAM: ICD-10-CM

## 2022-09-26 PROCEDURE — 77067 SCR MAMMO BI INCL CAD: CPT

## 2023-01-18 ENCOUNTER — OFFICE VISIT (OUTPATIENT)
Dept: MEDICAL GROUP | Facility: MEDICAL CENTER | Age: 58
End: 2023-01-18
Payer: COMMERCIAL

## 2023-01-18 VITALS
SYSTOLIC BLOOD PRESSURE: 108 MMHG | OXYGEN SATURATION: 99 % | DIASTOLIC BLOOD PRESSURE: 62 MMHG | WEIGHT: 151.57 LBS | HEIGHT: 65 IN | HEART RATE: 64 BPM | BODY MASS INDEX: 25.25 KG/M2 | TEMPERATURE: 97.7 F

## 2023-01-18 DIAGNOSIS — N81.4 UTERINE PROLAPSE: ICD-10-CM

## 2023-01-18 DIAGNOSIS — E55.9 VITAMIN D DEFICIENCY: ICD-10-CM

## 2023-01-18 DIAGNOSIS — Z00.00 PE (PHYSICAL EXAM), ANNUAL: ICD-10-CM

## 2023-01-18 DIAGNOSIS — E78.2 MIXED HYPERLIPIDEMIA: ICD-10-CM

## 2023-01-18 DIAGNOSIS — Z12.31 ENCOUNTER FOR SCREENING MAMMOGRAM FOR MALIGNANT NEOPLASM OF BREAST: ICD-10-CM

## 2023-01-18 PROCEDURE — 99396 PREV VISIT EST AGE 40-64: CPT | Performed by: NURSE PRACTITIONER

## 2023-01-18 RX ORDER — ERGOCALCIFEROL 1.25 MG/1
50000 CAPSULE ORAL
Qty: 12 CAPSULE | Refills: 3 | Status: SHIPPED | OUTPATIENT
Start: 2023-01-18

## 2023-01-18 ASSESSMENT — FIBROSIS 4 INDEX: FIB4 SCORE: 0.76

## 2023-01-18 ASSESSMENT — PATIENT HEALTH QUESTIONNAIRE - PHQ9: CLINICAL INTERPRETATION OF PHQ2 SCORE: 0

## 2023-01-18 NOTE — PROGRESS NOTES
Chief Complaint   Patient presents with    Annual Exam       HISTORY OF PRESENT ILLNESS: Patient is a 57 y.o. female, established patient who presents today to discuss medical problems as listed below:    Health Maintenance:  COMPLETED     Uterine prolapse  Chronic and stable.  Following with OB.  On estradiol patch.    Mixed hyperlipidemia  Chronic and stable. Healthy lifestyle, non smoker, active. No hx of DM, HTN. Denies CP, dyspnea.    Patient Active Problem List    Diagnosis Date Noted    Pain in shoulder region, left 03/31/2021    Allergic reaction 03/31/2021    History of left knee surgery 01/15/2021    Mixed hyperlipidemia 09/30/2020    Palpitations 09/30/2020    Episode of dizziness 08/05/2020    Uterine prolapse 07/23/2013        Allergies: Cleocin [clindamycin hcl]    Current Outpatient Medications   Medication Sig Dispense Refill    vitamin D2, Ergocalciferol, (DRISDOL) 1.25 MG (76793 UT) Cap capsule Take 1 Capsule by mouth every 7 days. 12 Capsule 3    estradiol (CLIMARA) 0.0375 MG/24HR patch Apply 1 Patch to skin as directed every 7 days.       No current facility-administered medications for this visit.       Social History     Tobacco Use    Smoking status: Never    Smokeless tobacco: Never   Vaping Use    Vaping Use: Never used   Substance Use Topics    Alcohol use: Yes     Comment: 1-2 per week    Drug use: No     Social History     Social History Narrative    Not on file       Family History   Problem Relation Age of Onset    Stroke Paternal Grandfather        Allergies, past medical history, past surgical history, family history, social history reviewed and updated.    Review of Systems:     - Constitutional: Negative for fever, chills, unexpected weight change, and fatigue/generalized weakness.     - HEENT: Negative for headaches, vision changes, hearing changes, ear pain, ear discharge, rhinorrhea, sinus congestion, sore throat, and neck pain.      - Respiratory: Negative for cough, sputum  "production, chest congestion, dyspnea, wheezing, and crackles.      - Cardiovascular: Negative for chest pain, palpitations, orthopnea, and bilateral lower extremity edema.     - Gastrointestinal: Negative for heartburn, nausea, vomiting, abdominal pain, hematochezia, melena, diarrhea, constipation, and greasy/foul-smelling stools.     - Genitourinary: Negative for dysuria, polyuria, hematuria, pyuria, urinary urgency, and urinary incontinence.    - Musculoskeletal: Negative for myalgias, back pain, and joint pain.     - Skin: Negative for rash, itching, cyanotic skin color change.     - Neurological: Negative for dizziness, tingling, tremors, focal sensory deficit, focal weakness and headaches.     - Endo/Heme/Allergies: Does not bruise/bleed easily.     - Psychiatric/Behavioral: Negative for depression, suicidal/homicidal ideation and memory loss.      All other systems reviewed and are negative    Exam:    /62 (BP Location: Left arm, Patient Position: Sitting, BP Cuff Size: Adult)   Pulse 64   Temp 36.5 °C (97.7 °F) (Temporal)   Ht 1.651 m (5' 5\")   Wt 68.8 kg (151 lb 9.1 oz)   LMP 06/29/2013   SpO2 99%   BMI 25.22 kg/m²  Body mass index is 25.22 kg/m².    Physical Exam:  Constitutional: Well-developed and well-nourished. Not diaphoretic. No distress.   Skin: Skin is warm and dry. No rash noted.  Head: Atraumatic without lesions.  Eyes: Conjunctivae and extraocular motions are normal. Pupils are equal, round, and reactive to light. No scleral icterus.   Ears:  External ears unremarkable. Tympanic membranes clear and intact.  Nose: Nares patent. Septum midline. Turbinates without erythema nor edema. No discharge.   Mouth/Throat: Dentition is normal. Tongue normal. Oropharynx is clear and moist. Posterior pharynx without erythema or exudates.  Neck: Supple, trachea midline. Normal range of motion. No thyromegaly present. No lymphadenopathy--cervical or supraclavicular.  Cardiovascular: Regular rate and " rhythm, S1 and S2 without murmur, rubs, or gallops.    Chest: Effort normal. Clear to auscultation throughout. No adventitious sounds. No CVA tenderness.  Abdomen: Soft, non tender, and without distention. Active bowel sounds in all four quadrants. No rebound, guarding, masses or HSM.  : Negative for dysuria, polyuria, hematuria, pyuria, urinary urgency, and urinary incontinence.  Extremities: No cyanosis, clubbing, erythema, nor edema. Distal pulses intact and symmetric.   Musculoskeletal: All major joints AROM full in all directions without pain.  Neurological: Alert and oriented x 3. DTRs 2+/3 and symmetric. No cranial nerve deficit. 5/5 myotomes. Sensation intact. Negative Rhomberg.  Psychiatric:  Behavior, mood, and affect are appropriate.  MA/nursing note and vitals reviewed.    LABS: 2021  results reviewed and discussed with the patient, questions answered.    Assessment/Plan:  1. PE (physical exam), annual  - CBC WITHOUT DIFFERENTIAL; Future  - Comp Metabolic Panel; Future  - Lipid Profile; Future  - VITAMIN D,25 HYDROXY (DEFICIENCY); Future  - T3 FREE; Future  - HEMOGLOBIN A1C; Future  - FREE THYROXINE; Future    2. Vitamin D deficiency  - vitamin D2, Ergocalciferol, (DRISDOL) 1.25 MG (86959 UT) Cap capsule; Take 1 Capsule by mouth every 7 days.  Dispense: 12 Capsule; Refill: 3    3. Encounter for screening mammogram for malignant neoplasm of breast  - MA-SCREENING MAMMO BILAT W/TOMOSYNTHESIS W/CAD; Future  - MA-SCREENING MAMMO BILAT W/TOMOSYNTHESIS W/CAD; Future  - MA-SCREENING MAMMO BILAT W/TOMOSYNTHESIS W/CAD; Future    4. Uterine prolapse  Following with OB    5. Mixed hyperlipidemia  Will obtain annual labs, continue healthy lifestyle     Patient counseled as below:  adult Visits 19- 64 Years and Older  History: Past illnesses, surgeries, medications, allergies, family and social histories, status of chronic conditions  Exam: Blood pressure, height, weight, BMI, hearing screening, depression  screening, eyes, ENT, cardiovascular, respiratory, GI, , musculoskeletal, skin, neurological, psychological, hematological  Counseling/Anticipatory Guidance: Nutrition, physical activity, healthy weight and diet, injury prevention (wear a helmet when riding a bicycle, motocycle, skiing, snowboarding or any other sport where head protecting is advised), skin cancer prevention (use of broad spectrum SPF 30 or higher, stay in the shade, wear sunglasses, wear a hat with wide brim, wear protective clothing covering extremities), misuse of tobacco, alcohol, and drugs, sexual behavior, dental health, mental health, fall prevention immunizations, recommended screenings for age/gender  Screening Services: Cholesterol, diabetes, colorectal cancer age 45 + per USPSTF  For Women: Breast cancer, cervical cancer, osteoporosis beginning at 65      Discussed with patient possible alternative diagnoses, patient is to take all medications as prescribed.      If symptoms persist FU w/PCP, if symptoms worsen go to emergency room.      If experiencing any side effects from prescribed medications report to the office immediately or go to emergency room.     Reviewed indication, dosage, usage and potential adverse effects of prescribed medications.      Reviewed risks and benefits of treatment plan. Patient verbalizes understanding of all instruction and verbally agrees to plan.     Discussed plan with the patient, and patient agrees to the above.      I personally reviewed prior external notes and test results pertinent to today's visit.      No follow-ups on file. Annual, PRN

## 2023-08-09 ENCOUNTER — OFFICE VISIT (OUTPATIENT)
Dept: MEDICAL GROUP | Facility: MEDICAL CENTER | Age: 58
End: 2023-08-09
Payer: COMMERCIAL

## 2023-08-09 VITALS
SYSTOLIC BLOOD PRESSURE: 110 MMHG | BODY MASS INDEX: 25.07 KG/M2 | HEART RATE: 64 BPM | TEMPERATURE: 97 F | DIASTOLIC BLOOD PRESSURE: 66 MMHG | HEIGHT: 65 IN | WEIGHT: 150.46 LBS | OXYGEN SATURATION: 99 % | RESPIRATION RATE: 16 BRPM

## 2023-08-09 DIAGNOSIS — Z12.31 ENCOUNTER FOR SCREENING MAMMOGRAM FOR MALIGNANT NEOPLASM OF BREAST: ICD-10-CM

## 2023-08-09 DIAGNOSIS — E78.2 MIXED HYPERLIPIDEMIA: ICD-10-CM

## 2023-08-09 PROCEDURE — 3078F DIAST BP <80 MM HG: CPT | Performed by: NURSE PRACTITIONER

## 2023-08-09 PROCEDURE — 3074F SYST BP LT 130 MM HG: CPT | Performed by: NURSE PRACTITIONER

## 2023-08-09 PROCEDURE — 99213 OFFICE O/P EST LOW 20 MIN: CPT | Performed by: NURSE PRACTITIONER

## 2023-08-09 NOTE — PROGRESS NOTES
Chief Complaint   Patient presents with    Other     Heart health        HISTORY OF PRESENT ILLNESS: Patient is a 58 y.o. female, established patient who presents today to discuss medical problems as listed below:    Health Maintenance:  COMPLETED       Allergies: Cleocin [clindamycin hcl]    Current Outpatient Medications   Medication Sig Dispense Refill    vitamin D2, Ergocalciferol, (DRISDOL) 1.25 MG (69084 UT) Cap capsule Take 1 Capsule by mouth every 7 days. 12 Capsule 3    estradiol (CLIMARA) 0.0375 MG/24HR patch Apply 1 Patch to skin as directed every 7 days.       No current facility-administered medications for this visit.       Allergies, past medical history, past surgical history, family history, social history reviewed and updated.    Review of Systems:     - Constitutional: Negative for fever, chills, unexpected weight change, and fatigue/generalized weakness.     - HEENT: Negative for headaches, vision changes, hearing changes, ear pain, ear discharge, rhinorrhea, sinus congestion, sore throat, and neck pain.      - Respiratory: Negative for cough, sputum production, chest congestion, dyspnea, wheezing, and crackles.      - Cardiovascular: Negative for chest pain, palpitations, orthopnea, and bilateral lower extremity edema.     - Gastrointestinal: Negative for heartburn, nausea, vomiting, abdominal pain, hematochezia, melena, diarrhea, constipation, and greasy/foul-smelling stools.     - Genitourinary: Negative for dysuria, polyuria, hematuria, pyuria, urinary urgency, and urinary incontinence.    - Musculoskeletal: Negative for myalgias, back pain, and joint pain.     - Skin: Negative for rash, itching, cyanotic skin color change.     - Neurological: Negative for dizziness, tingling, tremors, focal sensory deficit, focal weakness and headaches.     - Endo/Heme/Allergies: Does not bruise/bleed easily.     - Psychiatric/Behavioral: Negative for depression, suicidal/homicidal ideation and memory loss.   "    All other systems reviewed and are negative    Exam:    /66   Pulse 64   Temp 36.1 °C (97 °F) (Temporal)   Resp 16   Ht 1.651 m (5' 5\")   Wt 68.2 kg (150 lb 7.4 oz)   LMP 06/29/2013   SpO2 99%   BMI 25.04 kg/m²  Body mass index is 25.04 kg/m².    Physical Exam:  Constitutional: Well-developed and well-nourished. Not diaphoretic. No distress.   Skin: Skin is warm and dry. No rash noted.  Head: Atraumatic without lesions.  Eyes: Conjunctivae and extraocular motions are normal. Pupils are equal, round, and reactive to light. No scleral icterus.   Ears:  External ears unremarkable. Tympanic membranes clear and intact.  Nose: Nares patent. Septum midline. Turbinates without erythema nor edema. No discharge.   Mouth/Throat: Dentition is normal. Tongue normal. Oropharynx is clear and moist. Posterior pharynx without erythema or exudates.  Neck: Supple, trachea midline. Normal range of motion. No thyromegaly present. No lymphadenopathy--cervical or supraclavicular.  Cardiovascular: Regular rate and rhythm, S1 and S2 without murmur, rubs, or gallops.    Chest: Effort normal. Clear to auscultation throughout. No adventitious sounds. No CVA tenderness.  Abdomen: Soft, non tender, and without distention. Active bowel sounds in all four quadrants. No rebound, guarding, masses or HSM.  : Negative for dysuria, polyuria, hematuria, pyuria, urinary urgency, and urinary incontinence.  Extremities: No cyanosis, clubbing, erythema, nor edema. Distal pulses intact and symmetric.   Musculoskeletal: All major joints AROM full in all directions without pain.  Neurological: Alert and oriented x 3. DTRs 2+/3 and symmetric. No cranial nerve deficit. 5/5 myotomes. Sensation intact. Negative Rhomberg.  Psychiatric:  Behavior, mood, and affect are appropriate.  MA/nursing note and vitals reviewed.    LABS: 2021  results reviewed and discussed with the patient, questions answered.    Assessment/Plan:  Mixed hyperlipidemia   " Latest Reference Range & Units 04/05/21 07:03   Cholesterol,Tot 100 - 199 mg/dL 192   Triglycerides 0 - 149 mg/dL 76   HDL >=40 mg/dL 52   LDL <100 mg/dL 125 (H)   (H): Data is abnormally high.  Chronic and stable.  Patient lives healthy lifestyle terms of exercise daily, healthy nutrition.  Never smoker.  No family history of MI or stroke.  Father is on statins.    1. Mixed hyperlipidemia  - CT-HEART W/O CONT EVAL CALCIUM; Future    2. Encounter for screening mammogram for malignant neoplasm of breast  - MA-SCREENING MAMMO BILAT W/TOMOSYNTHESIS W/CAD; Future  Ob-Gyn/ History:    Last Pap Smear:  follows by OB    Health Maintenance  Cholesterol Screening: labs  Diabetes Screening: labs  Aspirin Use: no    Diet: regular   Exercise: daily   Substance Abuse: no   Safe in relationship.   Seat belts, bike helmet, gun safety discussed.  Sun protection used.    Cancer screening  Colorectal Cancer Screening: will obtain records    Lung Cancer Screening: non smoker, no s/s    Cervical Cancer Screening: followed by OB    Breast Cancer Screening: ordered        Infectious disease screening/Immunizations  --STI Screening: no concerns   --Practices safe sex.  --HIV Screening: no concerns   --Hepatitis C Screening:    --Immunizations: up to date     History: Past illnesses, surgeries, medications, allergies, family and social histories, status of chronic conditions  Exam: Blood pressure, height, weight, BMI, hearing screening, depression screening, eyes, ENT, cardiovascular, respiratory, GI, , musculoskeletal, skin, neurological, psychological, hematological  Counseling/Anticipatory Guidance: Nutrition, physical activity, healthy weight and diet, injury prevention (wear a helmet when riding a bicycle, motocycle, skiing, snowboarding or any other sport where head protecting is advised), skin cancer prevention (use of broad spectrum SPF 30 or higher, stay in the shade, wear sunglasses, wear a hat with wide brim, wear protective  clothing covering extremities), misuse of tobacco, alcohol, and drugs, sexual behavior, dental health, mental health, fall prevention immunizations, recommended screenings for age/gender  Screening Services: Cholesterol, diabetes, colorectal cancer age 45 + per USPSTF  For Women: Breast cancer, cervical cancer, osteoporosis beginning at 65      Discussed with patient possible alternative diagnoses, patient is to take all medications as prescribed.      If symptoms persist FU w/PCP, if symptoms worsen go to emergency room.      If experiencing any side effects from prescribed medications report to the office immediately or go to emergency room.     Reviewed indication, dosage, usage and potential adverse effects of prescribed medications.      Reviewed risks and benefits of treatment plan. Patient verbalizes understanding of all instruction and verbally agrees to plan.     Discussed plan with the patient, and patient agrees to the above.      I personally reviewed prior external notes and test results pertinent to today's visit.      No follow-ups on file. 3 mos

## 2023-08-09 NOTE — ASSESSMENT & PLAN NOTE
Latest Reference Range & Units 04/05/21 07:03   Cholesterol,Tot 100 - 199 mg/dL 192   Triglycerides 0 - 149 mg/dL 76   HDL >=40 mg/dL 52   LDL <100 mg/dL 125 (H)   (H): Data is abnormally high.  Chronic and stable.  Patient lives healthy lifestyle terms of exercise daily, healthy nutrition.  Never smoker.  No family history of MI or stroke.  Father is on statins.

## 2023-08-17 ENCOUNTER — HOSPITAL ENCOUNTER (OUTPATIENT)
Dept: LAB | Facility: MEDICAL CENTER | Age: 58
End: 2023-08-17
Attending: NURSE PRACTITIONER
Payer: COMMERCIAL

## 2023-08-17 DIAGNOSIS — Z00.00 PE (PHYSICAL EXAM), ANNUAL: ICD-10-CM

## 2023-08-17 LAB
25(OH)D3 SERPL-MCNC: 55 NG/ML (ref 30–100)
ALBUMIN SERPL BCP-MCNC: 4.3 G/DL (ref 3.2–4.9)
ALBUMIN/GLOB SERPL: 1.9 G/DL
ALP SERPL-CCNC: 57 U/L (ref 30–99)
ALT SERPL-CCNC: 14 U/L (ref 2–50)
ANION GAP SERPL CALC-SCNC: 9 MMOL/L (ref 7–16)
AST SERPL-CCNC: 21 U/L (ref 12–45)
BILIRUB SERPL-MCNC: 1 MG/DL (ref 0.1–1.5)
BUN SERPL-MCNC: 13 MG/DL (ref 8–22)
CALCIUM ALBUM COR SERPL-MCNC: 8.9 MG/DL (ref 8.5–10.5)
CALCIUM SERPL-MCNC: 9.1 MG/DL (ref 8.5–10.5)
CHLORIDE SERPL-SCNC: 104 MMOL/L (ref 96–112)
CHOLEST SERPL-MCNC: 222 MG/DL (ref 100–199)
CO2 SERPL-SCNC: 28 MMOL/L (ref 20–33)
CREAT SERPL-MCNC: 0.75 MG/DL (ref 0.5–1.4)
ERYTHROCYTE [DISTWIDTH] IN BLOOD BY AUTOMATED COUNT: 41.1 FL (ref 35.9–50)
EST. AVERAGE GLUCOSE BLD GHB EST-MCNC: 103 MG/DL
FASTING STATUS PATIENT QL REPORTED: NORMAL
GFR SERPLBLD CREATININE-BSD FMLA CKD-EPI: 92 ML/MIN/1.73 M 2
GLOBULIN SER CALC-MCNC: 2.3 G/DL (ref 1.9–3.5)
GLUCOSE SERPL-MCNC: 87 MG/DL (ref 65–99)
HBA1C MFR BLD: 5.2 % (ref 4–5.6)
HCT VFR BLD AUTO: 44.6 % (ref 37–47)
HDLC SERPL-MCNC: 53 MG/DL
HGB BLD-MCNC: 14.8 G/DL (ref 12–16)
LDLC SERPL CALC-MCNC: 138 MG/DL
MCH RBC QN AUTO: 29.8 PG (ref 27–33)
MCHC RBC AUTO-ENTMCNC: 33.2 G/DL (ref 32.2–35.5)
MCV RBC AUTO: 89.9 FL (ref 81.4–97.8)
PLATELET # BLD AUTO: 263 K/UL (ref 164–446)
PMV BLD AUTO: 10.1 FL (ref 9–12.9)
POTASSIUM SERPL-SCNC: 4.7 MMOL/L (ref 3.6–5.5)
PROT SERPL-MCNC: 6.6 G/DL (ref 6–8.2)
RBC # BLD AUTO: 4.96 M/UL (ref 4.2–5.4)
SODIUM SERPL-SCNC: 141 MMOL/L (ref 135–145)
T3FREE SERPL-MCNC: 3.05 PG/ML (ref 2–4.4)
T4 FREE SERPL-MCNC: 1.29 NG/DL (ref 0.93–1.7)
TRIGL SERPL-MCNC: 154 MG/DL (ref 0–149)
WBC # BLD AUTO: 5.4 K/UL (ref 4.8–10.8)

## 2023-08-17 PROCEDURE — 83036 HEMOGLOBIN GLYCOSYLATED A1C: CPT

## 2023-08-17 PROCEDURE — 80053 COMPREHEN METABOLIC PANEL: CPT

## 2023-08-17 PROCEDURE — 36415 COLL VENOUS BLD VENIPUNCTURE: CPT

## 2023-08-17 PROCEDURE — 85027 COMPLETE CBC AUTOMATED: CPT

## 2023-08-17 PROCEDURE — 82306 VITAMIN D 25 HYDROXY: CPT

## 2023-08-17 PROCEDURE — 84439 ASSAY OF FREE THYROXINE: CPT

## 2023-08-17 PROCEDURE — 84481 FREE ASSAY (FT-3): CPT

## 2023-08-17 PROCEDURE — 80061 LIPID PANEL: CPT

## 2023-08-25 ENCOUNTER — HOSPITAL ENCOUNTER (OUTPATIENT)
Dept: RADIOLOGY | Facility: MEDICAL CENTER | Age: 58
End: 2023-08-25
Attending: NURSE PRACTITIONER
Payer: COMMERCIAL

## 2023-08-25 DIAGNOSIS — E78.2 MIXED HYPERLIPIDEMIA: ICD-10-CM

## 2023-08-25 PROCEDURE — 4410556 CT-CARDIAC SCORING (SELF PAY ONLY)

## 2023-10-03 ENCOUNTER — APPOINTMENT (OUTPATIENT)
Dept: RADIOLOGY | Facility: MEDICAL CENTER | Age: 58
End: 2023-10-03
Attending: NURSE PRACTITIONER
Payer: COMMERCIAL

## 2023-10-03 DIAGNOSIS — Z12.31 ENCOUNTER FOR SCREENING MAMMOGRAM FOR MALIGNANT NEOPLASM OF BREAST: ICD-10-CM

## 2023-10-03 PROCEDURE — 77063 BREAST TOMOSYNTHESIS BI: CPT

## 2023-10-10 ENCOUNTER — TELEMEDICINE (OUTPATIENT)
Dept: MEDICAL GROUP | Facility: MEDICAL CENTER | Age: 58
End: 2023-10-10
Payer: COMMERCIAL

## 2023-10-10 DIAGNOSIS — R00.2 PALPITATIONS: ICD-10-CM

## 2023-10-10 DIAGNOSIS — E78.2 MIXED HYPERLIPIDEMIA: ICD-10-CM

## 2023-10-10 DIAGNOSIS — Z71.2 ENCOUNTER TO DISCUSS TEST RESULTS: ICD-10-CM

## 2023-10-10 PROCEDURE — 99214 OFFICE O/P EST MOD 30 MIN: CPT | Mod: 95 | Performed by: NURSE PRACTITIONER

## 2023-10-10 NOTE — PROGRESS NOTES
Virtual Visit: Established Patient   This visit was conducted via Zoom using secure and encrypted videoconferencing technology.   The patient was in their home in the [facility].    The patient's identity was confirmed and verbal consent was obtained for this virtual visit.   This evaluation was conducted via Zoom using secure and encrypted videoconferencing technology. The patient was in their home in the [facility].    The patient's identity was confirmed and verbal consent was obtained for this virtual visit.     Subjective:   CC: No chief complaint on file.      Antoinette Cano is a 58 y.o. female presenting for evaluation and management of:    Mixed hyperlipidemia   Latest Reference Range & Units 08/17/23 08:13   Cholesterol,Tot 100 - 199 mg/dL 222 (H)   Triglycerides 0 - 149 mg/dL 154 (H)   HDL >=40 mg/dL 53   LDL <100 mg/dL 138 (H)   (H): Data is abnormally high  The 10-year ASCVD risk score (Valentin FERGUSON, et al., 2019) is: 2.3%    Values used to calculate the score:      Age: 58 years      Sex: Female      Is Non- : No      Diabetic: No      Tobacco smoker: No      Systolic Blood Pressure: 110 mmHg      Is BP treated: No      HDL Cholesterol: 53 mg/dL      Total Cholesterol: 222 mg/dL   Non smoker, healthy lifestyle.   Admits to having a sweet tooth.  CT cardiac scoring is 0.  FH of MI in paternal GF at 58.      Palpitations  Chronic intermittent. Random, not associated w/ any patterns. 1-3 month. Denies anxiety, CP, SOB, peripheral edema.      ROS     Current medicines (including changes today)  Current Outpatient Medications   Medication Sig Dispense Refill    vitamin D2, Ergocalciferol, (DRISDOL) 1.25 MG (40483 UT) Cap capsule Take 1 Capsule by mouth every 7 days. 12 Capsule 3    estradiol (CLIMARA) 0.0375 MG/24HR patch Apply 1 Patch to skin as directed every 7 days.       No current facility-administered medications for this visit.       Patient Active Problem List     Diagnosis Date Noted    Pain in shoulder region, left 03/31/2021    Allergic reaction 03/31/2021    History of left knee surgery 01/15/2021    Mixed hyperlipidemia 09/30/2020    Palpitations 09/30/2020    Episode of dizziness 08/05/2020    Uterine prolapse 07/23/2013        Objective:   LMP 06/29/2013     Physical Exam:  Constitutional: Alert, no distress, well-groomed.  Skin: No rashes in visible areas.  Eye: Round. Conjunctiva clear, lids normal. No icterus.   ENMT: Lips pink without lesions, good dentition, moist mucous membranes. Phonation normal.  Neck: No masses, no thyromegaly. Moves freely without pain.  Respiratory: Unlabored respiratory effort, no cough or audible wheeze  Psych: Alert and oriented x3, normal affect and mood.     Assessment and Plan:   The following treatment plan was discussed:   1. Mixed hyperlipidemia  Uncontrolled, stable. Discussed etiology and potential risk factors. Discussed 10-year ASCVD risk. Educated on healthy lifestyle including nutrition and exercise.  Avoid excessive red meat and simple carbohydrates.  Increase fiber intake to 25-30 g daily if tolerated and take fish oil 2 g nightly.  Advised low saturated fat, low carbohydrate diet.  Maintain adequate hydration. Advise daily exercise 45-60 mins per tolerance and preference.   - REFERRAL TO CARDIOLOGY  - CBC WITHOUT DIFFERENTIAL; Future  - Comp Metabolic Panel; Future  - Lipid Profile; Future    2. Encounter to discuss test results  CT cardiac scoring test reviewed and results discussed with pt    3. Palpitations  - REFERRAL TO CARDIOLOGY  - HOLTER - Cardiology Performed (48HR); Future  - TSH WITH REFLEX TO FT4; Future       Discussed with patient possible alternative diagnoses, patient is to take all medications as prescribed.      If symptoms persist FU w/PCP, if symptoms worsen go to emergency room.      If experiencing any side effects from prescribed medications report to the office immediately or go to emergency room.      Reviewed indication, dosage, usage and potential adverse effects of prescribed medications.      Reviewed risks and benefits of treatment plan. Patient verbalizes understanding of all instruction and verbally agrees to plan.     Discussed plan with the patient, and patient agrees to the above.      I personally reviewed prior external notes and test results pertinent to today's visit.     Follow-up: annual, PRN

## 2023-10-10 NOTE — ASSESSMENT & PLAN NOTE
Latest Reference Range & Units 08/17/23 08:13   Cholesterol,Tot 100 - 199 mg/dL 222 (H)   Triglycerides 0 - 149 mg/dL 154 (H)   HDL >=40 mg/dL 53   LDL <100 mg/dL 138 (H)   (H): Data is abnormally high  The 10-year ASCVD risk score (Valentin FERGUSON, et al., 2019) is: 2.3%    Values used to calculate the score:      Age: 58 years      Sex: Female      Is Non- : No      Diabetic: No      Tobacco smoker: No      Systolic Blood Pressure: 110 mmHg      Is BP treated: No      HDL Cholesterol: 53 mg/dL      Total Cholesterol: 222 mg/dL   Non smoker, healthy lifestyle.   Admits to having a sweet tooth.  CT cardiac scoring is 0.  FH of MI in paternal GF at 58.

## 2023-10-10 NOTE — ASSESSMENT & PLAN NOTE
Chronic intermittent. Random, not associated w/ any patterns. 1-3 month. Denies anxiety, CP, SOB, peripheral edema.

## 2023-10-16 ENCOUNTER — NON-PROVIDER VISIT (OUTPATIENT)
Dept: CARDIOLOGY | Facility: MEDICAL CENTER | Age: 58
End: 2023-10-16
Attending: NURSE PRACTITIONER
Payer: COMMERCIAL

## 2023-10-16 DIAGNOSIS — I47.10 SVT (SUPRAVENTRICULAR TACHYCARDIA) (HCC): ICD-10-CM

## 2023-10-16 DIAGNOSIS — R00.2 PALPITATIONS: ICD-10-CM

## 2023-10-16 DIAGNOSIS — I49.1 APC (ATRIAL PREMATURE CONTRACTIONS): ICD-10-CM

## 2023-10-16 PROCEDURE — 93246 EXT ECG>7D<15D RECORDING: CPT | Performed by: INTERNAL MEDICINE

## 2023-10-16 NOTE — PROGRESS NOTES
Home enrollment completed in the 14 day ePatch Holter monitoring program per Nuria Palumbo MD.  Monitor will be shipped to patient via [employer]. Pending EOS.

## 2023-11-27 ENCOUNTER — TELEPHONE (OUTPATIENT)
Dept: CARDIOLOGY | Facility: MEDICAL CENTER | Age: 58
End: 2023-11-27
Payer: COMMERCIAL

## 2024-01-10 ENCOUNTER — APPOINTMENT (OUTPATIENT)
Dept: CARDIOLOGY | Facility: MEDICAL CENTER | Age: 59
End: 2024-01-10
Attending: NURSE PRACTITIONER
Payer: COMMERCIAL

## 2024-02-27 ENCOUNTER — APPOINTMENT (OUTPATIENT)
Dept: CARDIOLOGY | Facility: MEDICAL CENTER | Age: 59
End: 2024-02-27
Attending: NURSE PRACTITIONER
Payer: COMMERCIAL

## 2024-03-18 ENCOUNTER — TELEPHONE (OUTPATIENT)
Dept: CARDIOLOGY | Facility: MEDICAL CENTER | Age: 59
End: 2024-03-18
Payer: COMMERCIAL

## 2024-04-13 ENCOUNTER — HOSPITAL ENCOUNTER (OUTPATIENT)
Dept: LAB | Facility: MEDICAL CENTER | Age: 59
End: 2024-04-13
Attending: NURSE PRACTITIONER
Payer: COMMERCIAL

## 2024-04-13 DIAGNOSIS — E78.2 MIXED HYPERLIPIDEMIA: ICD-10-CM

## 2024-04-13 DIAGNOSIS — R00.2 PALPITATIONS: ICD-10-CM

## 2024-04-13 LAB
ALBUMIN SERPL BCP-MCNC: 4.3 G/DL (ref 3.2–4.9)
ALBUMIN/GLOB SERPL: 2 G/DL
ALP SERPL-CCNC: 54 U/L (ref 30–99)
ALT SERPL-CCNC: 12 U/L (ref 2–50)
ANION GAP SERPL CALC-SCNC: 12 MMOL/L (ref 7–16)
AST SERPL-CCNC: 19 U/L (ref 12–45)
BILIRUB SERPL-MCNC: 1 MG/DL (ref 0.1–1.5)
BUN SERPL-MCNC: 13 MG/DL (ref 8–22)
CALCIUM ALBUM COR SERPL-MCNC: 9 MG/DL (ref 8.5–10.5)
CALCIUM SERPL-MCNC: 9.2 MG/DL (ref 8.5–10.5)
CHLORIDE SERPL-SCNC: 106 MMOL/L (ref 96–112)
CHOLEST SERPL-MCNC: 209 MG/DL (ref 100–199)
CO2 SERPL-SCNC: 25 MMOL/L (ref 20–33)
CREAT SERPL-MCNC: 0.67 MG/DL (ref 0.5–1.4)
ERYTHROCYTE [DISTWIDTH] IN BLOOD BY AUTOMATED COUNT: 40.6 FL (ref 35.9–50)
FASTING STATUS PATIENT QL REPORTED: NORMAL
GFR SERPLBLD CREATININE-BSD FMLA CKD-EPI: 101 ML/MIN/1.73 M 2
GLOBULIN SER CALC-MCNC: 2.2 G/DL (ref 1.9–3.5)
GLUCOSE SERPL-MCNC: 84 MG/DL (ref 65–99)
HCT VFR BLD AUTO: 43 % (ref 37–47)
HDLC SERPL-MCNC: 48 MG/DL
HGB BLD-MCNC: 14.3 G/DL (ref 12–16)
LDLC SERPL CALC-MCNC: 138 MG/DL
MCH RBC QN AUTO: 29.7 PG (ref 27–33)
MCHC RBC AUTO-ENTMCNC: 33.3 G/DL (ref 32.2–35.5)
MCV RBC AUTO: 89.2 FL (ref 81.4–97.8)
PLATELET # BLD AUTO: 253 K/UL (ref 164–446)
PMV BLD AUTO: 10.3 FL (ref 9–12.9)
POTASSIUM SERPL-SCNC: 4.4 MMOL/L (ref 3.6–5.5)
PROT SERPL-MCNC: 6.5 G/DL (ref 6–8.2)
RBC # BLD AUTO: 4.82 M/UL (ref 4.2–5.4)
SODIUM SERPL-SCNC: 143 MMOL/L (ref 135–145)
TRIGL SERPL-MCNC: 117 MG/DL (ref 0–149)
TSH SERPL DL<=0.005 MIU/L-ACNC: 2.53 UIU/ML (ref 0.38–5.33)
WBC # BLD AUTO: 5.2 K/UL (ref 4.8–10.8)

## 2024-04-13 PROCEDURE — 36415 COLL VENOUS BLD VENIPUNCTURE: CPT

## 2024-04-13 PROCEDURE — 84443 ASSAY THYROID STIM HORMONE: CPT

## 2024-04-13 PROCEDURE — 85027 COMPLETE CBC AUTOMATED: CPT

## 2024-04-13 PROCEDURE — 80061 LIPID PANEL: CPT

## 2024-04-13 PROCEDURE — 80053 COMPREHEN METABOLIC PANEL: CPT

## 2024-04-17 ENCOUNTER — OFFICE VISIT (OUTPATIENT)
Dept: CARDIOLOGY | Facility: MEDICAL CENTER | Age: 59
End: 2024-04-17
Attending: INTERNAL MEDICINE
Payer: COMMERCIAL

## 2024-04-17 VITALS
DIASTOLIC BLOOD PRESSURE: 72 MMHG | BODY MASS INDEX: 25.83 KG/M2 | WEIGHT: 155 LBS | SYSTOLIC BLOOD PRESSURE: 108 MMHG | HEIGHT: 65 IN | RESPIRATION RATE: 14 BRPM | OXYGEN SATURATION: 94 % | HEART RATE: 78 BPM

## 2024-04-17 DIAGNOSIS — E78.2 MIXED HYPERLIPIDEMIA: ICD-10-CM

## 2024-04-17 DIAGNOSIS — I65.22 CAROTID ARTERY PLAQUE, LEFT: ICD-10-CM

## 2024-04-17 DIAGNOSIS — R00.2 PALPITATIONS: ICD-10-CM

## 2024-04-17 PROCEDURE — 93005 ELECTROCARDIOGRAM TRACING: CPT | Performed by: INTERNAL MEDICINE

## 2024-04-17 PROCEDURE — 99204 OFFICE O/P NEW MOD 45 MIN: CPT | Performed by: INTERNAL MEDICINE

## 2024-04-17 PROCEDURE — 3074F SYST BP LT 130 MM HG: CPT | Performed by: INTERNAL MEDICINE

## 2024-04-17 PROCEDURE — 99211 OFF/OP EST MAY X REQ PHY/QHP: CPT | Performed by: INTERNAL MEDICINE

## 2024-04-17 PROCEDURE — 3078F DIAST BP <80 MM HG: CPT | Performed by: INTERNAL MEDICINE

## 2024-04-17 RX ORDER — ROSUVASTATIN CALCIUM 5 MG/1
5 TABLET, COATED ORAL EVERY EVENING
Qty: 90 TABLET | Refills: 3 | Status: SHIPPED | OUTPATIENT
Start: 2024-04-17

## 2024-04-17 ASSESSMENT — ENCOUNTER SYMPTOMS
CLAUDICATION: 0
HEADACHES: 0
CONSTITUTIONAL NEGATIVE: 1
BRUISES/BLEEDS EASILY: 0
MYALGIAS: 1
BLURRED VISION: 0
FEVER: 0
DOUBLE VISION: 0
RESPIRATORY NEGATIVE: 1
PALPITATIONS: 0
CHILLS: 0
NEUROLOGICAL NEGATIVE: 1
NAUSEA: 0
CARDIOVASCULAR NEGATIVE: 1
PSYCHIATRIC NEGATIVE: 1
EYES NEGATIVE: 1
NERVOUS/ANXIOUS: 0
VOMITING: 0
DEPRESSION: 0
SHORTNESS OF BREATH: 0
WEAKNESS: 0
GASTROINTESTINAL NEGATIVE: 1
ABDOMINAL PAIN: 0
DIZZINESS: 0
NECK PAIN: 1
FOCAL WEAKNESS: 0
BACK PAIN: 1
COUGH: 0
WEIGHT LOSS: 0

## 2024-04-17 ASSESSMENT — FIBROSIS 4 INDEX: FIB4 SCORE: 1.26

## 2024-04-17 NOTE — PROGRESS NOTES
Chief Complaint   Patient presents with    Hyperlipidemia     NP Dx: Mixed hyperlipidemia       Subjective     Bibiana Cano is a 58 y.o. female who presents today as a consult from Nuria Galarza for palpitations.    Thank you for allowing me to evaluate Mrs. Cano, who as you know is a 58 year old female with dyslipidemia, carotid plaques, lifelong nonsmoker, no family history of coronary artery disease. She was suffering with palpitations which has improved. She denies chest pain, shortness of breath, nausea/vomiting or diaphoresis. She keeps active walking, hiking. She works at [employer]. She is planning to retire next year.    Past Medical History:   Diagnosis Date    Carotid artery disease (HCC)     Dental disorder 04/01/2013    multiple root canals    Hyperlipidemia     Indigestion     Infected puncture wound of plantar aspect of foot 06/23/2013    on antibiotics    Other specified symptom associated with female genital organs      Past Surgical History:   Procedure Laterality Date    CYSTOSCOPY  7/23/2013    Performed by Cory Medeiros M.D. at [email or web address]    VAGINAL HYSTERECTOMY SCOPE TOTAL  7/23/2013    Performed by Cory Medeiros M.D. at [email or web address]    HYSTEROSCOPY WITH VIDEO OPERATIVE  2/7/2012    Performed by CORY MEDEIROS at [email or web address]    DILATION AND CURETTAGE  2/7/2012    Performed by CORY MEDEIROS at [facility]    OTHER  2010    tooth implant    OTHER  2009    tooth extraction    OTHER  2008    tooth extraction    OTHER      LT knee surg     Family History   Problem Relation Age of Onset    Hyperlipidemia Father     Stroke Paternal Grandfather     Heart Attack Neg Hx      Social History     Socioeconomic History    Marital status:      Spouse name: Not on file    Number of children: Not on file    Years of education: Not on file    Highest education level: Bachelor's degree (e.g.,  BA, AB, BS)   Occupational History    Occupation: office   Tobacco Use    Smoking status: Never    Smokeless tobacco: Never   Vaping Use    Vaping Use: Never used   Substance and Sexual Activity    Alcohol use: Yes     Comment: 1-2 per week    Drug use: No    Sexual activity: Yes     Partners: Male   Other Topics Concern    Not on file   Social History Narrative    Not on file     Social Determinants of Health     Financial Resource Strain: Low Risk  (1/17/2023)    Overall Financial Resource Strain (CARDIA)     Difficulty of Paying Living Expenses: Not hard at all   Food Insecurity: No Food Insecurity (1/17/2023)    Hunger Vital Sign     Worried About Running Out of Food in the Last Year: Never true     Ran Out of Food in the Last Year: Never true   Transportation Needs: Unknown (1/17/2023)    PRAPARE - Transportation     Lack of Transportation (Medical): No     Lack of Transportation (Non-Medical): Not on file   Physical Activity: Sufficiently Active (1/17/2023)    Exercise Vital Sign     Days of Exercise per Week: 4 days     Minutes of Exercise per Session: 40 min   Stress: No Stress Concern Present (1/17/2023)    [place] [employer] - Occupational Stress Questionnaire     Feeling of Stress : Only a little   Social Connections: Moderately Integrated (1/17/2023)    Social Connection and Isolation Panel [NHANES]     Frequency of Communication with Friends and Family: More than three times a week     Frequency of Social Gatherings with Friends and Family: Once a week     Attends Pentec[employer]al Services: Never     Active Member of Clubs or Organizations: Yes     Attends Club or Organization Meetings: More than 4 times per year     Marital Status:    Intimate Partner Violence: Not on file   Housing Stability: Low Risk  (1/17/2023)    Housing Stability Vital Sign     Unable to Pay for Housing in the Last Year: No     Number of Places Lived in the Last Year: 1     Unstable Housing in the Last Year:  "No     Allergies   Allergen Reactions    Cleocin [Clindamycin Hcl] Vomiting     (Medications reviewed.)   Outpatient Encounter Medications as of 4/17/2024   Medication Sig Dispense Refill    vitamin D2, Ergocalciferol, (DRISDOL) 1.25 MG (38264 UT) Cap capsule Take 1 Capsule by mouth every 7 days. 12 Capsule 3    estradiol (CLIMARA) 0.0375 MG/24HR patch Apply 1 Patch to skin as directed every 7 days.       No facility-administered encounter medications on file as of 4/17/2024.     Review of Systems   Constitutional: Negative.  Negative for chills, fever, malaise/fatigue and weight loss.   HENT: Negative.  Negative for hearing loss.    Eyes: Negative.  Negative for blurred vision and double vision.   Respiratory: Negative.  Negative for cough and shortness of breath.    Cardiovascular: Negative.  Negative for chest pain, palpitations, claudication and leg swelling.   Gastrointestinal: Negative.  Negative for abdominal pain, nausea and vomiting.   Genitourinary: Negative.  Negative for dysuria and urgency.   Musculoskeletal:  Positive for back pain, myalgias and neck pain. Negative for joint pain.   Skin: Negative.  Negative for itching and rash.   Neurological: Negative.  Negative for dizziness, focal weakness, weakness and headaches.   Endo/Heme/Allergies: Negative.  Does not bruise/bleed easily.   Psychiatric/Behavioral: Negative.  Negative for depression. The patient is not nervous/anxious.               Objective     /72 (BP Location: Left arm, Patient Position: Sitting, BP Cuff Size: Adult)   Pulse 78   Resp 14   Ht 1.651 m (5' 5\")   Wt 70.3 kg (155 lb)   LMP 06/29/2013   SpO2 94%   BMI 25.79 kg/m²     Physical Exam  Constitutional:       Appearance: Normal appearance. She is well-developed and normal weight.   HENT:      Head: Normocephalic and atraumatic.   Neck:      Vascular: No JVD.   Cardiovascular:      Rate and Rhythm: Normal rate and regular rhythm.      Heart sounds: Normal heart sounds. "   Pulmonary:      Effort: Pulmonary effort is normal.      Breath sounds: Normal breath sounds.   Abdominal:      General: Bowel sounds are normal.      Palpations: Abdomen is soft.      Comments: No hepatosplenomegaly.   Musculoskeletal:         General: Normal range of motion.   Lymphadenopathy:      Cervical: No cervical adenopathy.   Skin:     General: Skin is warm and dry.   Neurological:      Mental Status: She is alert and oriented to person, place, and time.            CARDIAC STUDIES/PROCEDURES:    BIOTEL CONCLUSIONS  [employer]   DOS: 11/27/2023   Findings:   Analysis duration: 14 days 12 minutes 21 seconds   Underlying rhythm: Predominantly sinus rhythm   The average heart rate is 75 BPM, and ranges from  BPM   Atrial events: <1%  rare PACs   Rare brief SVT episodes occurred   Ventricular events: <1%  rare PVCs   Pauses or high degree heart block: None   Patient events: 10 patient triggers during sinus rhythm, symptoms reported include palpitations/skipping/heart fluttering/chest discomfort/neck, jaw or arm pain, tingling, left hand was cold, numbness.  Artifact was noted during some of the triggered events   Conclusion:   Rare PACs and PVCs   Patient triggered events correlated predominantly with sinus rhythm, but due to artifact on some of the strips, that limits accurate correlation of some of the triggered events   Rare brief SVT episodes were recorded     CAROTID ULTRASOUND (10/27/20)  Right carotid.   Flow velocities and Doppler waveforms are normal throughout the carotid system without evidence of plaque.   Left carotid.   Very mild plaque of the carotid bifurcation without significant stenosis.   (study result reviewed)     CT CARDIAC SCORING (08/25/23)  Coronary calcification:  LMA - 0.0  LCX - 0.0  LAD - 0.0  RCA - 0.0  PDA - 0.0  Total Calcium Score: 0.0  Percentile: Calcium score is below the 50th percentile for the patient's age and sex.  IMPRESSION:  Coronary Calcium Score of 0  (study  result reviewed)     ECHOCARDIOGRAM CONCLUSIONS (10/27/20)  No prior study is available for comparison.   Normal transthoracic echocardiogram.   (study result reviewed)     Laboratory results of (04/13/24) were reviewed. Cholesterol profile of 209/117/48/138 mg/dL noted.     Assessment & Plan     1. Palpitations        2. Mixed hyperlipidemia  EKG      3. Carotid artery plaque, left            Medical Decision Making: Today's Assessment/Status/Plan:        Palpitations: She is doing well with mild palpitations.   Hyperlipidemia: Currently not well controlled on strict diet and exercise therapy. Inconsideration of carotid plaques, we will start statin therapy with rosuvastatin 5 mg QD. We will repeat labs including fasting lipid profile in 3 months.    Carotid artery stenosis: Clinically stable on above medical therapy.     We will follow up in 3 months.    Thank you for this consult.

## 2024-04-18 LAB — EKG IMPRESSION: NORMAL

## 2024-04-18 PROCEDURE — 93010 ELECTROCARDIOGRAM REPORT: CPT | Performed by: INTERNAL MEDICINE

## 2024-05-03 DIAGNOSIS — E55.9 VITAMIN D DEFICIENCY: ICD-10-CM

## 2024-05-03 RX ORDER — ERGOCALCIFEROL 1.25 MG/1
50000 CAPSULE ORAL
Qty: 12 CAPSULE | Refills: 1 | Status: SHIPPED | OUTPATIENT
Start: 2024-05-03

## 2024-05-03 NOTE — TELEPHONE ENCOUNTER
Received request via: Patient    Was the patient seen in the last year in this department? Yes    Does the patient have an active prescription (recently filled or refills available) for medication(s) requested? No    Pharm[facility] Name[facility]    Does the patient have longterm Plus and need 100 day supply (blood pressure, diabetes and cholesterol meds only)? Patient does not have SCP

## 2024-05-31 ENCOUNTER — PATIENT MESSAGE (OUTPATIENT)
Dept: MEDICAL GROUP | Facility: MEDICAL CENTER | Age: 59
End: 2024-05-31
Payer: COMMERCIAL

## 2024-06-05 ENCOUNTER — TELEMEDICINE (OUTPATIENT)
Dept: MEDICAL GROUP | Facility: MEDICAL CENTER | Age: 59
End: 2024-06-05
Payer: COMMERCIAL

## 2024-06-05 VITALS — BODY MASS INDEX: 24.99 KG/M2 | RESPIRATION RATE: 16 BRPM | HEIGHT: 65 IN | WEIGHT: 150 LBS

## 2024-06-05 DIAGNOSIS — E78.2 MIXED HYPERLIPIDEMIA: ICD-10-CM

## 2024-06-05 DIAGNOSIS — G89.29 NECK PAIN, CHRONIC: ICD-10-CM

## 2024-06-05 DIAGNOSIS — S16.1XXA STRAIN OF NECK MUSCLE, INITIAL ENCOUNTER: ICD-10-CM

## 2024-06-05 DIAGNOSIS — M54.2 NECK PAIN, CHRONIC: ICD-10-CM

## 2024-06-05 DIAGNOSIS — M25.512 PAIN IN SHOULDER REGION, LEFT: ICD-10-CM

## 2024-06-05 PROBLEM — T78.40XA ALLERGIC REACTION: Status: RESOLVED | Noted: 2021-03-31 | Resolved: 2024-06-05

## 2024-06-05 PROCEDURE — 99214 OFFICE O/P EST MOD 30 MIN: CPT | Performed by: NURSE PRACTITIONER

## 2024-06-05 ASSESSMENT — ENCOUNTER SYMPTOMS
FEVER: 0
PALPITATIONS: 0
CHILLS: 0

## 2024-06-05 ASSESSMENT — PATIENT HEALTH QUESTIONNAIRE - PHQ9: CLINICAL INTERPRETATION OF PHQ2 SCORE: 0

## 2024-06-05 ASSESSMENT — FIBROSIS 4 INDEX: FIB4 SCORE: 1.28

## 2024-06-05 NOTE — PROGRESS NOTES
Virtual Visit: Established Patient   This visit was conducted via Zoom using secure and encrypted videoconferencing technology.   The patient was in their home in the [facility].    The patient's identity was confirmed and verbal consent was obtained for this virtual visit.   This evaluation was conducted via Zoom using secure and encrypted videoconferencing technology. The patient was in their home in the [facility].    The patient's identity was confirmed and verbal consent was obtained for this virtual visit.     Patient agreed to using DAHLIA: yes    Bibiana was seen today for follow-up.    Diagnoses and all orders for this visit:    Pain in shoulder region, left  -     Referral to Physical Therapy    Neck pain, chronic  -     Referral to Physical Therapy    Strain of neck muscle, initial encounter  -     Referral to Physical Therapy    Mixed hyperlipidemia              Assessment & Plan  1. Left shoulder pain.  This is a chronic and stable condition. The patient has expressed interest in a referral to physical therapy, which has been arranged. At present, she is not interested in prescription for pain control as her pain is tolerable.    2. Neck strain.  The patient has been undergoing physical therapy and is seeking a referral to a new physical therapist. She has also expressed interest in exploring a chiropractic integrative approach, for which a referral has been provided.    3. Mixed hyperlipidemia.  This is a chronic and stable condition. She is currently under the care of [facility] and was recently prescribed a low dose of Crestor 5 mg, which she is tolerating well, continue.    Subjective:   CC:   Chief Complaint   Patient presents with    Follow-Up     Referral PT       History of Present Illness  The patient is a pleasant 59-year-old female who is presenting via Zoom for the following.    The patient is seeking a referral to a new physical therapist for her left shoulder and neck. In  "02/2024, she experienced an episode of severe pain in her left shoulder and neck, which lasted for approximately two weeks. The pain was so severe that it disrupted her sleep and limited her mobility. The pain was particularly severe when transitioning from a seated to a standing position, but improved with movement. Despite attempts at massage and dry needling by another physical therapist and a chiropractor, the pain persisted. She sought a different physical therapist, Pauline, at [facility], and has already scheduled an appointment with them. She attributes the pain to stress, as the pain flared-up during a recent overnight trip with her mother.    The patient is currently on rosuvastatin for cholesterol management, which she is tolerating well. Her cardiologist, Dr. Hall, prescribed a trial of this medication, with a follow-up appointment scheduled in 3 months to assess its impact on her cholesterol levels.       Review of Systems   Constitutional:  Negative for chills, fever and malaise/fatigue.   Cardiovascular:  Negative for chest pain, palpitations and leg swelling.        Current medicines (including changes today)  Current Outpatient Medications   Medication Sig Dispense Refill    TURMERIC PO Take  by mouth.      vitamin D2, Ergocalciferol, (DRISDOL) 1.25 MG (95837 UT) Cap capsule Take 1 Capsule by mouth every 7 days. 12 Capsule 1    rosuvastatin (CRESTOR) 5 MG Tab Take 1 Tablet by mouth every evening. 90 Tablet 3    estradiol (CLIMARA) 0.0375 MG/24HR patch Apply 1 Patch to skin as directed every 7 days.       No current facility-administered medications for this visit.        Objective:   Resp 16   Ht 1.651 m (5' 5\")   Wt 68 kg (150 lb)   LMP 06/29/2013   BMI 24.96 kg/m²     Physical Exam:  Constitutional: Alert, no distress, well-groomed.  Skin: No rashes in visible areas.  Eye: Round. Conjunctiva clear, lids normal. No icterus.   ENMT: Lips pink without lesions, good dentition, moist " mucous membranes. Phonation normal.  Neck: No masses, no thyromegaly. Moves freely without pain.  Respiratory: Unlabored respiratory effort, no cough or audible wheeze  Psych: Alert and oriented x3, normal affect and mood.     Results         Discussed with patient possible alternative diagnoses, patient is to take all medications as prescribed.      If symptoms persist FU w/PCP, if symptoms worsen go to emergency room.      If experiencing any side effects from prescribed medications report to the office immediately or go to emergency room.     Reviewed indication, dosage, usage and potential adverse effects of prescribed medications.      Reviewed risks and benefits of treatment plan. Patient verbalizes understanding of all instruction and verbally agrees to plan.     Discussed plan with the patient, and patient agrees to the above.      I personally reviewed prior external notes and test results pertinent to today's visit.     No follow-ups on file. return if symptoms are not improving or getting worse

## 2024-07-12 ENCOUNTER — HOSPITAL ENCOUNTER (OUTPATIENT)
Dept: LAB | Facility: MEDICAL CENTER | Age: 59
End: 2024-07-12
Attending: INTERNAL MEDICINE
Payer: COMMERCIAL

## 2024-07-12 DIAGNOSIS — E78.2 MIXED HYPERLIPIDEMIA: ICD-10-CM

## 2024-07-12 LAB
ALBUMIN SERPL BCP-MCNC: 4.2 G/DL (ref 3.2–4.9)
ALBUMIN/GLOB SERPL: 1.6 G/DL
ALP SERPL-CCNC: 56 U/L (ref 30–99)
ALT SERPL-CCNC: 15 U/L (ref 2–50)
ANION GAP SERPL CALC-SCNC: 8 MMOL/L (ref 7–16)
AST SERPL-CCNC: 19 U/L (ref 12–45)
BILIRUB SERPL-MCNC: 1.1 MG/DL (ref 0.1–1.5)
BUN SERPL-MCNC: 13 MG/DL (ref 8–22)
CALCIUM ALBUM COR SERPL-MCNC: 8.3 MG/DL (ref 8.5–10.5)
CALCIUM SERPL-MCNC: 8.5 MG/DL (ref 8.4–10.2)
CHLORIDE SERPL-SCNC: 105 MMOL/L (ref 96–112)
CHOLEST SERPL-MCNC: 146 MG/DL (ref 100–199)
CO2 SERPL-SCNC: 28 MMOL/L (ref 20–33)
CREAT SERPL-MCNC: 0.72 MG/DL (ref 0.5–1.4)
FASTING STATUS PATIENT QL REPORTED: NORMAL
GFR SERPLBLD CREATININE-BSD FMLA CKD-EPI: 96 ML/MIN/1.73 M 2
GLOBULIN SER CALC-MCNC: 2.6 G/DL (ref 1.9–3.5)
GLUCOSE SERPL-MCNC: 93 MG/DL (ref 65–99)
HDLC SERPL-MCNC: 55 MG/DL
LDLC SERPL CALC-MCNC: 75 MG/DL
POTASSIUM SERPL-SCNC: 4.3 MMOL/L (ref 3.6–5.5)
PROT SERPL-MCNC: 6.8 G/DL (ref 6–8.2)
SODIUM SERPL-SCNC: 141 MMOL/L (ref 135–145)
TRIGL SERPL-MCNC: 81 MG/DL (ref 0–149)

## 2024-07-12 PROCEDURE — 80053 COMPREHEN METABOLIC PANEL: CPT

## 2024-07-12 PROCEDURE — 80061 LIPID PANEL: CPT

## 2024-07-12 PROCEDURE — 36415 COLL VENOUS BLD VENIPUNCTURE: CPT

## 2024-07-22 ENCOUNTER — APPOINTMENT (OUTPATIENT)
Dept: CARDIOLOGY | Facility: MEDICAL CENTER | Age: 59
End: 2024-07-22
Attending: INTERNAL MEDICINE
Payer: COMMERCIAL

## 2024-08-20 ENCOUNTER — APPOINTMENT (OUTPATIENT)
Dept: CARDIOLOGY | Facility: MEDICAL CENTER | Age: 59
End: 2024-08-20
Payer: COMMERCIAL

## 2024-09-09 ENCOUNTER — APPOINTMENT (OUTPATIENT)
Dept: CARDIOLOGY | Facility: MEDICAL CENTER | Age: 59
End: 2024-09-09
Attending: INTERNAL MEDICINE
Payer: COMMERCIAL

## 2024-09-09 VITALS
OXYGEN SATURATION: 96 % | WEIGHT: 153 LBS | RESPIRATION RATE: 16 BRPM | DIASTOLIC BLOOD PRESSURE: 82 MMHG | BODY MASS INDEX: 25.49 KG/M2 | HEART RATE: 69 BPM | SYSTOLIC BLOOD PRESSURE: 118 MMHG | HEIGHT: 65 IN

## 2024-09-09 DIAGNOSIS — E78.2 MIXED HYPERLIPIDEMIA: ICD-10-CM

## 2024-09-09 DIAGNOSIS — I65.22 CAROTID ARTERY PLAQUE, LEFT: ICD-10-CM

## 2024-09-09 PROCEDURE — 99214 OFFICE O/P EST MOD 30 MIN: CPT | Performed by: INTERNAL MEDICINE

## 2024-09-09 PROCEDURE — 99211 OFF/OP EST MAY X REQ PHY/QHP: CPT | Performed by: INTERNAL MEDICINE

## 2024-09-09 PROCEDURE — 3074F SYST BP LT 130 MM HG: CPT | Performed by: INTERNAL MEDICINE

## 2024-09-09 PROCEDURE — 3079F DIAST BP 80-89 MM HG: CPT | Performed by: INTERNAL MEDICINE

## 2024-09-09 ASSESSMENT — ENCOUNTER SYMPTOMS
COUGH: 0
NEUROLOGICAL NEGATIVE: 1
WEAKNESS: 0
SHORTNESS OF BREATH: 0
FEVER: 0
CARDIOVASCULAR NEGATIVE: 1
HEADACHES: 0
PALPITATIONS: 0
DEPRESSION: 0
CHILLS: 0
EYES NEGATIVE: 1
MYALGIAS: 0
DOUBLE VISION: 0
VOMITING: 0
BRUISES/BLEEDS EASILY: 0
FOCAL WEAKNESS: 0
WEIGHT LOSS: 0
CONSTITUTIONAL NEGATIVE: 1
MUSCULOSKELETAL NEGATIVE: 1
NERVOUS/ANXIOUS: 0
NAUSEA: 0
PSYCHIATRIC NEGATIVE: 1
ABDOMINAL PAIN: 0
CLAUDICATION: 0
RESPIRATORY NEGATIVE: 1
BLURRED VISION: 0
GASTROINTESTINAL NEGATIVE: 1
DIZZINESS: 0

## 2024-09-09 ASSESSMENT — FIBROSIS 4 INDEX: FIB4 SCORE: 1.14

## 2024-09-09 NOTE — PROGRESS NOTES
Chief Complaint   Patient presents with    Palpitations       Subjective     Bibiana Cano is a 59 y.o. female who presents today for follow up of dyslipidemia, carotid artery stenosis, medication change evaluation.    Since the patient's last visit on 04/17/24, she has been doing well clinically from cardiac standpoint. She denies fatigue, chest pain, shortness of breath, palpitations, lower extremity edema, dizziness or syncope. On the last visit she was started on rosuvastatin, however, she has stopped taking this medications for fear of dementia.  She keeps active walking and hiking.    Past Medical History:   Diagnosis Date    Carotid artery disease (HCC)     Dental disorder 04/01/2013    multiple root canals    Hyperlipidemia     Indigestion     Infected puncture wound of plantar aspect of foot 06/23/2013    on antibiotics    Other specified symptom associated with female genital organs      Past Surgical History:   Procedure Laterality Date    CYSTOSCOPY  7/23/2013    Performed by Cory Medeiros M.D. at [facility]    VAGINAL HYSTERECTOMY SCOPE TOTAL  7/23/2013    Performed by Cory Medeiros M.D. at [facility]    HYSTEROSCOPY WITH VIDEO OPERATIVE  2/7/2012    Performed by CORY MEDEIROS at [facility]    DILATION AND CURETTAGE  2/7/2012    Performed by CORY MEDEIROS at [facility]    OTHER  2010    tooth implant    OTHER  2009    tooth extraction    OTHER  2008    tooth extraction    OTHER      LT knee surg     Family History   Problem Relation Age of Onset    Hyperlipidemia Father     Stroke Paternal Grandfather     Heart Attack Neg Hx      Social History     Socioeconomic History    Marital status:      Spouse name: Not on file    Number of children: Not on file    Years of education: Not on file    Highest education level: Bachelor's degree (e.g., BA, AB, BS)   Occupational History    Occupation: office    Tobacco Use    Smoking status: Never    Smokeless tobacco: Never   Vaping Use    Vaping status: Never Used   Substance and Sexual Activity    Alcohol use: Yes     Comment: 1-2 per week    Drug use: No    Sexual activity: Yes     Partners: Male   Other Topics Concern    Not on file   Social History Narrative    Not on file     Social Determinants of Health     Financial Resource Strain: Low Risk  (1/17/2023)    Overall Financial Resource Strain (CARDIA)     Difficulty of Paying Living Expenses: Not hard at all   Food Insecurity: No Food Insecurity (1/17/2023)    Hunger Vital Sign     Worried About Running Out of Food in the Last Year: Never true     Ran Out of Food in the Last Year: Never true   Transportation Needs: Unknown (1/17/2023)    PRAPARE - Transportation     Lack of Transportation (Medical): No     Lack of Transportation (Non-Medical): Not on file   Physical Activity: Sufficiently Active (1/17/2023)    Exercise Vital Sign     Days of Exercise per Week: 4 days     Minutes of Exercise per Session: 40 min   Stress: No Stress Concern Present (1/17/2023)    [employer] - Occupational Stress Questionnaire     Feeling of Stress : Only a little   Social Connections: Moderately Integrated (1/17/2023)    Social Connection and Isolation Panel [NHANES]     Frequency of Communication with Friends and Family: More than three times a week     Frequency of Social Gatherings with Friends and Family: Once a week     Attends [employer] Services: Never     Active Member of Clubs or Organizations: Yes     Attends Club or Organization Meetings: More than 4 times per year     Marital Status:    Intimate Partner Violence: Not on file   Housing Stability: Low Risk  (1/17/2023)    Housing Stability Vital Sign     Unable to Pay for Housing in the Last Year: No     Number of Places Lived in the Last Year: 1     Unstable Housing in the Last Year: No     Allergies   Allergen Reactions    Cleocin  "[Clindamycin Hcl] Vomiting     (Medications reviewed.)  Outpatient Encounter Medications as of 9/9/2024   Medication Sig Dispense Refill    TURMERIC PO Take  by mouth.      vitamin D2, Ergocalciferol, (DRISDOL) 1.25 MG (11926 UT) Cap capsule Take 1 Capsule by mouth every 7 days. 12 Capsule 1    rosuvastatin (CRESTOR) 5 MG Tab Take 1 Tablet by mouth every evening. 90 Tablet 3    estradiol (CLIMARA) 0.0375 MG/24HR patch Apply 1 Patch to skin as directed every 7 days.       No facility-administered encounter medications on file as of 9/9/2024.     Review of Systems   Constitutional: Negative.  Negative for chills, fever, malaise/fatigue and weight loss.   HENT: Negative.  Negative for hearing loss.    Eyes: Negative.  Negative for blurred vision and double vision.   Respiratory: Negative.  Negative for cough and shortness of breath.    Cardiovascular: Negative.  Negative for chest pain, palpitations, claudication and leg swelling.   Gastrointestinal: Negative.  Negative for abdominal pain, nausea and vomiting.   Genitourinary: Negative.  Negative for dysuria and urgency.   Musculoskeletal: Negative.  Negative for joint pain and myalgias.   Skin: Negative.  Negative for itching and rash.   Neurological: Negative.  Negative for dizziness, focal weakness, weakness and headaches.   Endo/Heme/Allergies: Negative.  Does not bruise/bleed easily.   Psychiatric/Behavioral: Negative.  Negative for depression. The patient is not nervous/anxious.               Objective     /82 (BP Location: Left arm, Patient Position: Sitting, BP Cuff Size: Adult)   Pulse 69   Resp 16   Ht 1.651 m (5' 5\")   Wt 69.4 kg (153 lb)   LMP 06/29/2013   SpO2 96%   BMI 25.46 kg/m²     Physical Exam  Constitutional:       Appearance: Normal appearance. She is well-developed and normal weight.   HENT:      Head: Normocephalic and atraumatic.   Neck:      Vascular: No JVD.   Cardiovascular:      Rate and Rhythm: Normal rate and regular rhythm. "      Heart sounds: Normal heart sounds.   Pulmonary:      Effort: Pulmonary effort is normal.      Breath sounds: Normal breath sounds.   Abdominal:      General: Bowel sounds are normal.      Palpations: Abdomen is soft.      Comments: No hepatosplenomegaly.   Musculoskeletal:         General: Normal range of motion.   Lymphadenopathy:      Cervical: No cervical adenopathy.   Skin:     General: Skin is warm and dry.   Neurological:      Mental Status: She is alert and oriented to person, place, and time.            CARDIAC STUDIES/PROCEDURES:     BIOTEL CONCLUSIONS  [employer]   DOS: 11/27/2023   Findings:   Analysis duration: 14 days 12 minutes 21 seconds   Underlying rhythm: Predominantly sinus rhythm   The average heart rate is 75 BPM, and ranges from  BPM   Atrial events: <1%  rare PACs   Rare brief SVT episodes occurred   Ventricular events: <1%  rare PVCs   Pauses or high degree heart block: None   Patient events: 10 patient triggers during sinus rhythm, symptoms reported include palpitations/skipping/heart fluttering/chest discomfort/neck, jaw or arm pain, tingling, left hand was cold, numbness.  Artifact was noted during some of the triggered events   Conclusion:   Rare PACs and PVCs   Patient triggered events correlated predominantly with sinus rhythm, but due to artifact on some of the strips, that limits accurate correlation of some of the triggered events   Rare brief SVT episodes were recorded      CAROTID ULTRASOUND (10/27/20)  Right carotid.   Flow velocities and Doppler waveforms are normal throughout the carotid system without evidence of plaque.   Left carotid.   Very mild plaque of the carotid bifurcation without significant stenosis.   (study result reviewed)      CT CARDIAC SCORING (08/25/23)  Coronary calcification:  LMA - 0.0  LCX - 0.0  LAD - 0.0  RCA - 0.0  PDA - 0.0  Total Calcium Score: 0.0  Percentile: Calcium score is below the 50th percentile for the patient's age and  sex.  IMPRESSION:  Coronary Calcium Score of 0    ECHOCARDIOGRAM CONCLUSIONS (10/27/20)  No prior study is available for comparison.   Normal transthoracic echocardiogram.     EKG performed on (04/17/24) was reviewed: EKG personally interpreted shows sinus rhythm.     Laboratory results of (07/12/24) were reviewed. Cholesterol profile of 146/81/55/75 mg/dL noted.  Laboratory results of (04/13/24) Cholesterol profile of 209/117/48/138 mg/dL noted.     Assessment & Plan     1. Mixed hyperlipidemia        2. Carotid artery plaque, left            Medical Decision Making: Today's Assessment/Status/Plan:        Hyperlipidemia: She is a 59 year old female with dyslipidemia, carotid artery stenosis. She is doing well on statin therapy without myalgia symptoms.  Carotid artery stenosis: Clinically stable on above medical therapy.   Additional information: She works at [employer].     We will follow up the patient in one year.    CC Nuria Galarza

## 2024-10-29 DIAGNOSIS — E55.9 VITAMIN D DEFICIENCY: ICD-10-CM

## 2024-10-31 RX ORDER — ERGOCALCIFEROL 1.25 MG/1
50000 CAPSULE, LIQUID FILLED ORAL
Qty: 12 CAPSULE | Refills: 0 | Status: SHIPPED | OUTPATIENT
Start: 2024-10-31

## 2024-11-26 ENCOUNTER — TELEPHONE (OUTPATIENT)
Dept: CARDIOLOGY | Facility: MEDICAL CENTER | Age: 59
End: 2024-11-26
Payer: COMMERCIAL

## 2024-11-26 NOTE — TELEPHONE ENCOUNTER
Called pt in regards to lab work that was ordered at previous OV.No answer, LVM to call back. Pt has follow up appointment scheduled with NARAYAN on 12/11/24.

## 2024-12-11 ENCOUNTER — APPOINTMENT (OUTPATIENT)
Dept: CARDIOLOGY | Facility: MEDICAL CENTER | Age: 59
End: 2024-12-11
Attending: INTERNAL MEDICINE
Payer: COMMERCIAL

## 2025-01-02 ENCOUNTER — HOSPITAL ENCOUNTER (OUTPATIENT)
Dept: LAB | Facility: MEDICAL CENTER | Age: 60
End: 2025-01-02
Attending: INTERNAL MEDICINE
Payer: COMMERCIAL

## 2025-01-02 DIAGNOSIS — E78.2 MIXED HYPERLIPIDEMIA: ICD-10-CM

## 2025-01-02 PROCEDURE — 80061 LIPID PANEL: CPT

## 2025-01-02 PROCEDURE — 36415 COLL VENOUS BLD VENIPUNCTURE: CPT

## 2025-01-02 PROCEDURE — 80053 COMPREHEN METABOLIC PANEL: CPT

## 2025-01-03 LAB
ALBUMIN SERPL BCP-MCNC: 4.2 G/DL (ref 3.2–4.9)
ALBUMIN/GLOB SERPL: 1.8 G/DL
ALP SERPL-CCNC: 54 U/L (ref 30–99)
ALT SERPL-CCNC: 17 U/L (ref 2–50)
ANION GAP SERPL CALC-SCNC: 9 MMOL/L (ref 7–16)
AST SERPL-CCNC: 25 U/L (ref 12–45)
BILIRUB SERPL-MCNC: 1.3 MG/DL (ref 0.1–1.5)
BUN SERPL-MCNC: 15 MG/DL (ref 8–22)
CALCIUM ALBUM COR SERPL-MCNC: 8.5 MG/DL (ref 8.5–10.5)
CALCIUM SERPL-MCNC: 8.7 MG/DL (ref 8.5–10.5)
CHLORIDE SERPL-SCNC: 104 MMOL/L (ref 96–112)
CHOLEST SERPL-MCNC: 240 MG/DL (ref 100–199)
CO2 SERPL-SCNC: 27 MMOL/L (ref 20–33)
CREAT SERPL-MCNC: 0.7 MG/DL (ref 0.5–1.4)
FASTING STATUS PATIENT QL REPORTED: NORMAL
GFR SERPLBLD CREATININE-BSD FMLA CKD-EPI: 99 ML/MIN/1.73 M 2
GLOBULIN SER CALC-MCNC: 2.3 G/DL (ref 1.9–3.5)
GLUCOSE SERPL-MCNC: 87 MG/DL (ref 65–99)
HDLC SERPL-MCNC: 57 MG/DL
LDLC SERPL CALC-MCNC: 162 MG/DL
POTASSIUM SERPL-SCNC: 4.4 MMOL/L (ref 3.6–5.5)
PROT SERPL-MCNC: 6.5 G/DL (ref 6–8.2)
SODIUM SERPL-SCNC: 140 MMOL/L (ref 135–145)
TRIGL SERPL-MCNC: 103 MG/DL (ref 0–149)

## 2025-01-06 ENCOUNTER — PATIENT MESSAGE (OUTPATIENT)
Dept: CARDIOLOGY | Facility: MEDICAL CENTER | Age: 60
End: 2025-01-06
Payer: COMMERCIAL

## 2025-01-06 ENCOUNTER — TELEPHONE (OUTPATIENT)
Dept: CARDIOLOGY | Facility: MEDICAL CENTER | Age: 60
End: 2025-01-06
Payer: COMMERCIAL

## 2025-01-06 DIAGNOSIS — I65.22 CAROTID ARTERY PLAQUE, LEFT: ICD-10-CM

## 2025-01-06 DIAGNOSIS — E78.2 MIXED HYPERLIPIDEMIA: ICD-10-CM

## 2025-02-04 ENCOUNTER — APPOINTMENT (OUTPATIENT)
Dept: MEDICAL GROUP | Facility: MEDICAL CENTER | Age: 60
End: 2025-02-04
Payer: COMMERCIAL

## 2025-02-06 ENCOUNTER — TELEMEDICINE (OUTPATIENT)
Dept: MEDICAL GROUP | Facility: MEDICAL CENTER | Age: 60
End: 2025-02-06
Payer: COMMERCIAL

## 2025-02-06 DIAGNOSIS — Z78.0 POST-MENOPAUSE: ICD-10-CM

## 2025-02-06 DIAGNOSIS — G89.29 NECK PAIN, CHRONIC: ICD-10-CM

## 2025-02-06 DIAGNOSIS — E78.2 MIXED HYPERLIPIDEMIA: ICD-10-CM

## 2025-02-06 DIAGNOSIS — M19.042 ARTHRITIS OF BOTH HANDS: ICD-10-CM

## 2025-02-06 DIAGNOSIS — R42 EPISODE OF DIZZINESS: ICD-10-CM

## 2025-02-06 DIAGNOSIS — M54.2 NECK PAIN, CHRONIC: ICD-10-CM

## 2025-02-06 DIAGNOSIS — M19.041 ARTHRITIS OF BOTH HANDS: ICD-10-CM

## 2025-02-06 PROCEDURE — 99214 OFFICE O/P EST MOD 30 MIN: CPT | Mod: 95 | Performed by: NURSE PRACTITIONER

## 2025-02-06 RX ORDER — MELOXICAM 7.5 MG/1
15 TABLET ORAL DAILY
COMMUNITY

## 2025-02-06 ASSESSMENT — ENCOUNTER SYMPTOMS
PALPITATIONS: 0
CHILLS: 0
FEVER: 0

## 2025-02-06 NOTE — PROGRESS NOTES
Patient states he will need a refill on his   Demadex , started prescription for you.   Thank you, Joaquina Jonas LPN     Virtual Visit: Established Patient   This visit was conducted via Teams using secure and encrypted videoconferencing technology.   The patient was in their home in the [facility].    The patient's identity was confirmed and verbal consent was obtained for this virtual visit.   This evaluation was conducted via Teams using secure and encrypted videoconferencing technology. The patient was in their home in the [facility].    The patient's identity was confirmed and verbal consent was obtained for this virtual visit.     Patient agreed to using DAHLIA: yes    Bibiana was seen today for follow-up.    Diagnoses and all orders for this visit:    Mixed hyperlipidemia    Arthritis of both hands    Post-menopause    Neck pain, chronic              Assessment & Plan  1.  Mixed hyperlipidemia  Chronic and stable condition.  Previously well-controlled on rosuvastatin 5 mg, patient stopped taking she was not sure how it will affect combining with meloxicam.  She will restart.  Most recent lab panel with elevated LDL and total cholesterol.    2. Arthritis.  3.  Chronic neck pain  Chronic and stable condition.  She has been experiencing arthritis in her thumbs and has been prescribed meloxicam 15 mg for pain management. She was advised to consider occupational therapy and the use of arthritic gloves. The application of Voltaren gel cream, an over-the-counter product, was also suggested. Meloxicam should be taken on an as-needed basis for severe pain episodes. She was informed that weather conditions can influence arthritis symptoms. A consultation with a hand specialist was recommended for further evaluation and management.  Patient is following with Alia.    4.  Postmenopause symptoms  Patient is currently on estradiol patch which has been prescribed to her OB/GYN.  Continue    5.  Dizzy episode  Patient reports feeling dizzy today.  She has been recovering from influenza.  Advised to continue supportive care, adequate  hydration and rest.  If symptoms persist she will follow-up in office.    6. Health maintenance.  She is due for her annual exam, including breast cancer screening and colonoscopy. She was advised to schedule an in-person annual exam once she feels better.        Subjective:   CC:   Chief Complaint   Patient presents with    Follow-Up       History of Present Illness  The patient presents via virtual visit for follow-up on medication management.    She has expressed concerns regarding the potential renal impact of her current medications, which include rosuvastatin, vitamin D, and estrogen patches. She discontinued rosuvastatin in December 2024 due to elevated blood work results. She is also on estrogen patches prescribed by her OB/GYN, Dr. Ivania Boyd, for menopausal symptoms, even though she does not experience hot flashes. She reports that the estrogen patches have improved her joint health. She is also taking minocycline 50 mg as needed for rosacea, prescribed by her dermatologist.    She has been experiencing joint issues, including a frozen shoulder, and has been receiving physical therapy. She has a long-standing history of thumb discomfort, which was evaluated through x-rays at [facility] last fall, revealing the onset of arthritis. In December 2024, she was prescribed meloxicam 15 mg daily for a 30-day trial period, with the instruction to avoid concurrent aspirin use. She has consulted a hand specialist at [facility], who recommended the use of a brace during flare-ups.    She contracted influenza on Monday and experienced dizziness today, which she attributes to her illness. She is currently on day 6 of her flu symptoms.    MEDICATIONS  Current: Rosuvastatin, vitamin D, estrogen patch, meloxicam, minocycline.       Review of Systems   Constitutional:  Negative for chills, fever and malaise/fatigue.   Cardiovascular:  Negative for chest pain, palpitations and leg swelling.   Musculoskeletal:   Positive for joint pain.        Current medicines (including changes today)  Current Outpatient Medications   Medication Sig Dispense Refill    meloxicam (MOBIC) 7.5 MG Tab Take 15 mg by mouth every day.      vitamin D2, Ergocalciferol, (DRISDOL) 1.25 MG (89250 UT) Cap capsule TAKE 1 CAPSULE BY MOUTH EVERY 7 DAYS 12 Capsule 0    TURMERIC PO Take  by mouth.      rosuvastatin (CRESTOR) 5 MG Tab Take 1 Tablet by mouth every evening. 90 Tablet 3    estradiol (CLIMARA) 0.0375 MG/24HR patch Apply 1 Patch to skin as directed every 7 days.       No current facility-administered medications for this visit.        Objective:   LMP 06/29/2013     Physical Exam:  Constitutional: Alert, no distress, well-groomed.  Skin: No rashes in visible areas.  Eye: Round. Conjunctiva clear, lids normal. No icterus.   ENMT: Lips pink without lesions, good dentition, moist mucous membranes. Phonation normal.  Neck: No masses, no thyromegaly. Moves freely without pain.  Respiratory: Unlabored respiratory effort, no cough or audible wheeze  Psych: Alert and oriented x3, normal affect and mood.     Results  Laboratory Studies  LDL cholesterol increased to 162 from a previous normal range of 70 to 75. Total cholesterol increased to 240.    Imaging  X-rays of thumbs showed arthritis.       Discussed with patient possible alternative diagnoses, patient is to take all medications as prescribed.      If symptoms persist FU w/PCP, if symptoms worsen go to emergency room.      If experiencing any side effects from prescribed medications report to the office immediately or go to emergency room.     Reviewed indication, dosage, usage and potential adverse effects of prescribed medications.      Reviewed risks and benefits of treatment plan. Patient verbalizes understanding of all instruction and verbally agrees to plan.     Discussed plan with the patient, and patient agrees to the above.      I personally reviewed prior external notes and test results  pertinent to today's visit.     No follow-ups on file. Will schedule for an annual exam

## 2025-04-02 ENCOUNTER — HOSPITAL ENCOUNTER (OUTPATIENT)
Facility: MEDICAL CENTER | Age: 60
End: 2025-04-02
Attending: INTERNAL MEDICINE
Payer: COMMERCIAL

## 2025-04-02 DIAGNOSIS — I65.22 CAROTID ARTERY PLAQUE, LEFT: ICD-10-CM

## 2025-04-02 DIAGNOSIS — E78.2 MIXED HYPERLIPIDEMIA: ICD-10-CM

## 2025-04-02 LAB
CHOLEST SERPL-MCNC: 153 MG/DL (ref 100–199)
HDLC SERPL-MCNC: 56 MG/DL
LDLC SERPL CALC-MCNC: 78 MG/DL
TRIGL SERPL-MCNC: 94 MG/DL (ref 0–149)

## 2025-04-02 PROCEDURE — 80061 LIPID PANEL: CPT

## 2025-04-02 PROCEDURE — 36415 COLL VENOUS BLD VENIPUNCTURE: CPT

## 2025-04-04 ENCOUNTER — RESULTS FOLLOW-UP (OUTPATIENT)
Dept: CARDIOLOGY | Facility: MEDICAL CENTER | Age: 60
End: 2025-04-04
Payer: COMMERCIAL

## 2025-04-08 NOTE — TELEPHONE ENCOUNTER
----- Message from Physician Rigoberto Camarena M.D. sent at 4/7/2025 10:58 AM PDT -----  Please notify patient of labs showing better cholesterol level. Please have the patient continue to work on smart dietary choices. We can increase rosuvastatin to 10 mg to lower LDL to target for carotid artery stenosis if she wishes. If so, please repeat labs in 3 months. Thank you.  SANA

## 2025-04-18 DIAGNOSIS — E78.2 MIXED HYPERLIPIDEMIA: ICD-10-CM

## 2025-04-21 NOTE — TELEPHONE ENCOUNTER
Is the patient due for a refill? Yes    Was the patient seen the last 15 months? Yes    Date of last office visit: 9.9.2024    Does the patient have an upcoming appointment?  No       Provider to refill:JI    Does the patient have long term Plus and need 100-day supply? (This applies to ALL medications) Patient does not have SCP

## 2025-04-22 RX ORDER — ROSUVASTATIN CALCIUM 5 MG/1
5 TABLET, COATED ORAL EVERY EVENING
Qty: 90 TABLET | Refills: 1 | Status: SHIPPED | OUTPATIENT
Start: 2025-04-22

## 2025-06-06 ENCOUNTER — APPOINTMENT (OUTPATIENT)
Dept: RADIOLOGY | Facility: MEDICAL CENTER | Age: 60
End: 2025-06-06
Attending: NURSE PRACTITIONER
Payer: COMMERCIAL

## 2025-06-06 DIAGNOSIS — Z12.31 VISIT FOR SCREENING MAMMOGRAM: ICD-10-CM

## 2025-06-06 PROCEDURE — 77063 BREAST TOMOSYNTHESIS BI: CPT

## 2025-06-11 ENCOUNTER — RESULTS FOLLOW-UP (OUTPATIENT)
Dept: MEDICAL GROUP | Facility: MEDICAL CENTER | Age: 60
End: 2025-06-11

## 2025-07-27 ENCOUNTER — APPOINTMENT (OUTPATIENT)
Dept: RADIOLOGY | Facility: MEDICAL CENTER | Age: 60
End: 2025-07-27
Attending: EMERGENCY MEDICINE
Payer: COMMERCIAL

## 2025-07-27 ENCOUNTER — HOSPITAL ENCOUNTER (EMERGENCY)
Facility: MEDICAL CENTER | Age: 60
End: 2025-07-28
Attending: EMERGENCY MEDICINE
Payer: COMMERCIAL

## 2025-07-27 ASSESSMENT — FIBROSIS 4 INDEX: FIB4 SCORE: 1.44

## 2025-07-28 ENCOUNTER — APPOINTMENT (OUTPATIENT)
Dept: RADIOLOGY | Facility: MEDICAL CENTER | Age: 60
End: 2025-07-28
Attending: EMERGENCY MEDICINE
Payer: COMMERCIAL

## 2025-07-28 ASSESSMENT — HEART SCORE
HISTORY: SLIGHTLY SUSPICIOUS
HEART SCORE: 2
RISK FACTORS: 1-2 RISK FACTORS
ECG: NORMAL
TROPONIN: LESS THAN OR EQUAL TO NORMAL LIMIT
AGE: 45-64

## 2025-07-28 NOTE — ED NOTES
PT verbalizes understanding of discharge instructions. PT ambulates to lobby with steady gate accompanied by spouse

## 2025-07-28 NOTE — ED TRIAGE NOTES
Chief Complaint   Patient presents with    Numbness     Numbness to left arm, shoulder to fingertips began this AM 0900. Denies injury. Pt reports this has resolved besides pinky finger.     Dizziness     Pt reports she has felt dizziness since 0900. Pt reports decreased dizziness at this time.   Pt reports she has been on a trip and has not taken her statin in a few days, on ABX for dental infection.     BP (!) 144/91   Pulse (!) 59   Temp 36.1 °C (96.9 °F) (Temporal)   Resp 16   Wt 70.4 kg (155 lb 4.8 oz)   LMP 06/29/2013   SpO2 99%   BMI 25.84 kg/m²

## 2025-07-28 NOTE — ED PROVIDER NOTES
ED Provider Note    CHIEF COMPLAINT  Chief Complaint   Patient presents with    Numbness     Numbness to left arm, shoulder to fingertips began this AM 0900. Denies injury. Pt reports this has resolved besides pinky finger.     Dizziness     Pt reports she has felt dizziness since 0900. Pt reports decreased dizziness at this time.   Pt reports she has been on a trip and has not taken her statin in a few days, on ABX for dental infection.       EXTERNAL RECORDS REVIEWED  Cardiology visit with Dr. Camarena on 9/9/2024.      HPI/ROS  LIMITATION TO HISTORY   None  OUTSIDE HISTORIAN(S):   at bedside, contributes to the history below    Antoinette Cano is a 60 y.o. female with noted past history of carotid artery disease, hyperlipidemia who presents with concerns of acute onset left arm numbness.  This happened approximately 14 hours ago when she was in [facility].  She says she was at normal elevation.  This happened when she was visiting there earlier today.  It was not occurring with exertion.  She also felt lightheaded when it happened.  No speech changes.  No facial droop.  No weakness.  No syncope.  No palpitations.  No vertigo-like symptoms.  No chest pain.  It lasted a very short period of time, a matter of minutes.  When she arrived home later in the day she had some similar symptoms.  It was a tingling feeling again not associated with any weakness or other neurologic changes.  She discussed with her  and they decided to come to the emergency department.  There are some concerns for possible early stroke.  She has had a history of neck problems, describes having physical therapy and spasms at the left side of her neck in the past.  Has had some similar numbness sensations in her hand at the ring and small finger.  Currently symptoms are resolved.    PAST MEDICAL HISTORY   has a past medical history of Carotid artery disease (HCC), Dental disorder (04/01/2013), Hyperlipidemia,  Indigestion, Infected puncture wound of plantar aspect of foot (06/23/2013), and Other specified symptom associated with female genital organs.    SURGICAL HISTORY   has a past surgical history that includes other; other (2009); other (2010); other (2008); hysteroscopy with video operative (02/07/2012); dilation and curettage (02/07/2012); cystoscopy (07/23/2013); and vaginal hysterectomy scope total (07/23/2013).    FAMILY HISTORY  Family History   Problem Relation Age of Onset    Hyperlipidemia Father     Stroke Paternal Grandfather     Heart Attack Neg Hx        SOCIAL HISTORY  Social History     Tobacco Use    Smoking status: Never    Smokeless tobacco: Never   Vaping Use    Vaping status: Never Used   Substance and Sexual Activity    Alcohol use: Yes     Comment: 1-2 per week    Drug use: No    Sexual activity: Yes     Partners: Male       CURRENT MEDICATIONS  Home Medications       Reviewed by Johanna Dean R.N. (Registered Nurse) on 07/27/25 at 2251  Med List Status: Partial     Medication Last Dose Status   estradiol (CLIMARA) 0.0375 MG/24HR patch  Active   meloxicam (MOBIC) 7.5 MG Tab  Active   rosuvastatin (CRESTOR) 5 MG Tab  Active   TURMERIC PO  Active   vitamin D2, Ergocalciferol, (DRISDOL) 1.25 MG (67049 UT) Cap capsule  Active                    ALLERGIES  Allergies[1]    PHYSICAL EXAM  VITAL SIGNS: /76   Pulse (!) 56   Temp 36.1 °C (96.9 °F) (Temporal)   Resp 15   Wt 70.4 kg (155 lb 4.8 oz)   LMP 06/29/2013   SpO2 97%   BMI 25.84 kg/m²    Physical Exam  Vitals and nursing note reviewed.   Constitutional:       General: She is not in acute distress.     Appearance: Normal appearance. She is not diaphoretic.   HENT:      Head: Normocephalic and atraumatic.      Mouth/Throat:      Mouth: Mucous membranes are moist.   Eyes:      Extraocular Movements: Extraocular movements intact.      Pupils: Pupils are equal, round, and reactive to light.      Comments: No nystagmus   Cardiovascular:       Rate and Rhythm: Normal rate.      Pulses: Normal pulses.      Comments: No murmur.  Regular rhythm.  Pulmonary:      Effort: No respiratory distress.      Breath sounds: Normal breath sounds.   Abdominal:      Tenderness: There is no abdominal tenderness.   Musculoskeletal:      Comments: Left hand is well-perfused.  No sensory deficit.  Normal strength.   Skin:     Findings: No rash.   Neurological:      Mental Status: She is alert.      Comments: Alert and oriented to person place time situation.  Clear speech.  No aphasia.  No facial droop.  5-5 strength in all extremities.  No sensory deficit.  No ataxia.           EKG/LABS  Results for orders placed or performed during the hospital encounter of 07/27/25   CBC WITH DIFFERENTIAL    Collection Time: 07/27/25 11:13 PM   Result Value Ref Range    WBC 6.5 4.8 - 10.8 K/uL    RBC 4.50 4.20 - 5.40 M/uL    Hemoglobin 13.6 12.0 - 16.0 g/dL    Hematocrit 40.7 37.0 - 47.0 %    MCV 90.4 81.4 - 97.8 fL    MCH 30.2 27.0 - 33.0 pg    MCHC 33.4 32.2 - 35.5 g/dL    RDW 41.3 35.9 - 50.0 fL    Platelet Count 242 164 - 446 K/uL    MPV 9.6 9.0 - 12.9 fL    Neutrophils-Polys 54.40 44.00 - 72.00 %    Lymphocytes 34.80 22.00 - 41.00 %    Monocytes 6.40 0.00 - 13.40 %    Eosinophils 3.10 0.00 - 6.90 %    Basophils 0.50 0.00 - 1.80 %    Immature Granulocytes 0.80 0.00 - 0.90 %    Nucleated RBC 0.00 0.00 - 0.20 /100 WBC    Neutrophils (Absolute) 3.56 1.82 - 7.42 K/uL    Lymphs (Absolute) 2.27 1.00 - 4.80 K/uL    Monos (Absolute) 0.42 0.00 - 0.85 K/uL    Eos (Absolute) 0.20 0.00 - 0.51 K/uL    Baso (Absolute) 0.03 0.00 - 0.12 K/uL    Immature Granulocytes (abs) 0.05 0.00 - 0.11 K/uL    NRBC (Absolute) 0.00 K/uL   COMP METABOLIC PANEL    Collection Time: 07/27/25 11:13 PM   Result Value Ref Range    Sodium 141 135 - 145 mmol/L    Potassium 3.6 3.6 - 5.5 mmol/L    Chloride 106 96 - 112 mmol/L    Co2 24 20 - 33 mmol/L    Anion Gap 11.0 7.0 - 16.0    Glucose 84 65 - 99 mg/dL    Bun 13 8 -  22 mg/dL    Creatinine 0.72 0.50 - 1.40 mg/dL    Calcium 8.5 8.5 - 10.5 mg/dL    Correct Calcium 8.5 8.5 - 10.5 mg/dL    AST(SGOT) 19 12 - 45 U/L    ALT(SGPT) 18 2 - 50 U/L    Alkaline Phosphatase 45 30 - 99 U/L    Total Bilirubin 1.0 0.1 - 1.5 mg/dL    Albumin 4.0 3.2 - 4.9 g/dL    Total Protein 6.1 6.0 - 8.2 g/dL    Globulin 2.1 1.9 - 3.5 g/dL    A-G Ratio 1.9 g/dL   TROPONIN    Collection Time: 25 11:13 PM   Result Value Ref Range    Troponin T <6 6 - 19 ng/L   ESTIMATED GFR    Collection Time: 25 11:13 PM   Result Value Ref Range    GFR (CKD-EPI) 96 >60 mL/min/1.73 m 2   EKG (NOW)    Collection Time: 25 12:57 AM   Result Value Ref Range    Report       [facility] Emergency Dept.    Test Date:  2025  Pt Name:    EFFIE QUINONES                  Department: EDS  MRN:        [id number]                      [facility]  Gender:     Female                       Technician: [id number]  :        1965                   Requested By:ALISON BENTLEY II  Order #:    [id number]                    Reading MD:    Measurements  Intervals                                Axis  Rate:       56                           P:          68  OH:         159                          QRS:        52  QRSD:       94                           T:          35  QT:         425  QTc:        411    Interpretive Statements  Sinus rhythm  Compared to ECG 2024 14:21:38  No significant changes     EKG   Sinus rhythm  Rate 56  Normal intervals  No ST elevation or depression.  Normal T waves.  Impression: Normal sinus rhythm EKG    I have independently interpreted this EKG    RADIOLOGY/PROCEDURES       Radiologist interpretation:  CT-CTA NECK WITH & W/O-POST PROCESSING   Final Result      No focal stenosis, dissection or occlusion of the cervical carotid or vertebral arteries.      CT-CTA HEAD WITH & W/O-POST PROCESS   Final Result      1.  No acute intracranial abnormality.   2.  No  intracranial aneurysm, focal stenosis, or abrupt large vessel cut off.      DX-CHEST-PORTABLE (1 VIEW)   Final Result      No acute cardiopulmonary disease.          COURSE & MEDICAL DECISION MAKING    ASSESSMENT, COURSE AND PLAN  Care Narrative: This is emergency department evaluation of a 60-year-old woman with hyperlipidemia who presents with paresthesias in her left arm that have been transient twice today.  She is concerned about possible early stroke symptoms.  This could be a TIA or cervical radiculopathy. Unlikely peripheral arterial occlusion, she has great pulses and had is well perfused.  Currently neurologic exam is normal.  Plan for CTA head and neck, labs CBC, CMP, troponin.  EKG and a chest x-ray.      12:42 AM  Labs within acceptable limits.  Imaging without signs of occlusion.  Internal carotid arteries without significant disease.  Troponin undetectable.  It is unclear as if this is a TIA but given findings here I think it is less likely.  Could be more of a cervical radiculopathy.  My opinion she is safe for discharge and I recommend that she make an appointment with primary provider for continued evaluation outpatient.      CHEST PAIN:   HEART Score for Major Cardiac Events  HEART Score     History: Slightly suspicious  ECG: Normal  Age: 45-64  Risk Factors: 1-2 risk factors  Troponin: Less than or equal to normal limit    Heart Score: 2    Total Score   0-3 Points = Low Score, risk of MACE 0.9-1.7%.  4-6 Points = Moderate Score, risk of MACE 12-16.6%  7-10 Points = High Score, risk of MACE 50-65%           PROBLEMS MANAGED  #Left arm paresthesia   -transient   -lower suspicion for TIA, suspect more likely radiculopathy    DISPOSITION AND DISCUSSIONS      Escalation of care considered, and ultimately not performed:acute inpatient care management, however at this time, the patient is most appropriate for outpatient management        FINAL DIAGNOSIS  1. Paresthesia         Electronically signed by:  Ethan Knight II, M.D., 7/27/2025 10:54 PM           [1]   Allergies  Allergen Reactions    Cleocin [Clindamycin Hcl] Vomiting